# Patient Record
Sex: FEMALE | Race: BLACK OR AFRICAN AMERICAN | NOT HISPANIC OR LATINO | URBAN - METROPOLITAN AREA
[De-identification: names, ages, dates, MRNs, and addresses within clinical notes are randomized per-mention and may not be internally consistent; named-entity substitution may affect disease eponyms.]

---

## 2020-05-11 ENCOUNTER — EMERGENCY (EMERGENCY)
Facility: HOSPITAL | Age: 25
LOS: 1 days | Discharge: HOME | End: 2020-05-11
Attending: EMERGENCY MEDICINE | Admitting: EMERGENCY MEDICINE
Payer: COMMERCIAL

## 2020-05-11 VITALS
SYSTOLIC BLOOD PRESSURE: 115 MMHG | OXYGEN SATURATION: 100 % | TEMPERATURE: 99 F | DIASTOLIC BLOOD PRESSURE: 56 MMHG | RESPIRATION RATE: 16 BRPM | HEART RATE: 66 BPM

## 2020-05-11 VITALS
OXYGEN SATURATION: 100 % | SYSTOLIC BLOOD PRESSURE: 133 MMHG | HEART RATE: 92 BPM | RESPIRATION RATE: 16 BRPM | DIASTOLIC BLOOD PRESSURE: 78 MMHG | TEMPERATURE: 99 F

## 2020-05-11 LAB
ALBUMIN SERPL ELPH-MCNC: 4.2 G/DL — SIGNIFICANT CHANGE UP (ref 3.3–5)
ALP SERPL-CCNC: 47 U/L — SIGNIFICANT CHANGE UP (ref 40–120)
ALT FLD-CCNC: 8 U/L — SIGNIFICANT CHANGE UP (ref 4–33)
ANION GAP SERPL CALC-SCNC: 15 MMO/L — HIGH (ref 7–14)
APPEARANCE UR: CLEAR — SIGNIFICANT CHANGE UP
AST SERPL-CCNC: 13 U/L — SIGNIFICANT CHANGE UP (ref 4–32)
BACTERIA # UR AUTO: SIGNIFICANT CHANGE UP
BASOPHILS # BLD AUTO: 0.05 K/UL — SIGNIFICANT CHANGE UP (ref 0–0.2)
BASOPHILS NFR BLD AUTO: 0.6 % — SIGNIFICANT CHANGE UP (ref 0–2)
BILIRUB SERPL-MCNC: 0.2 MG/DL — SIGNIFICANT CHANGE UP (ref 0.2–1.2)
BILIRUB UR-MCNC: NEGATIVE — SIGNIFICANT CHANGE UP
BLD GP AB SCN SERPL QL: NEGATIVE — SIGNIFICANT CHANGE UP
BLOOD UR QL VISUAL: HIGH
BUN SERPL-MCNC: 8 MG/DL — SIGNIFICANT CHANGE UP (ref 7–23)
CALCIUM SERPL-MCNC: 9.3 MG/DL — SIGNIFICANT CHANGE UP (ref 8.4–10.5)
CHLORIDE SERPL-SCNC: 102 MMOL/L — SIGNIFICANT CHANGE UP (ref 98–107)
CO2 SERPL-SCNC: 23 MMOL/L — SIGNIFICANT CHANGE UP (ref 22–31)
COLOR SPEC: YELLOW — SIGNIFICANT CHANGE UP
CREAT SERPL-MCNC: 0.82 MG/DL — SIGNIFICANT CHANGE UP (ref 0.5–1.3)
EOSINOPHIL # BLD AUTO: 0.18 K/UL — SIGNIFICANT CHANGE UP (ref 0–0.5)
EOSINOPHIL NFR BLD AUTO: 2.3 % — SIGNIFICANT CHANGE UP (ref 0–6)
GLUCOSE SERPL-MCNC: 82 MG/DL — SIGNIFICANT CHANGE UP (ref 70–99)
GLUCOSE UR-MCNC: NEGATIVE — SIGNIFICANT CHANGE UP
HCG SERPL-ACNC: 4185 MIU/ML — SIGNIFICANT CHANGE UP
HCT VFR BLD CALC: 32.7 % — LOW (ref 34.5–45)
HGB BLD-MCNC: 10.9 G/DL — LOW (ref 11.5–15.5)
HYALINE CASTS # UR AUTO: NEGATIVE — SIGNIFICANT CHANGE UP
IMM GRANULOCYTES NFR BLD AUTO: 0.3 % — SIGNIFICANT CHANGE UP (ref 0–1.5)
KETONES UR-MCNC: NEGATIVE — SIGNIFICANT CHANGE UP
LEUKOCYTE ESTERASE UR-ACNC: NEGATIVE — SIGNIFICANT CHANGE UP
LYMPHOCYTES # BLD AUTO: 2.12 K/UL — SIGNIFICANT CHANGE UP (ref 1–3.3)
LYMPHOCYTES # BLD AUTO: 27 % — SIGNIFICANT CHANGE UP (ref 13–44)
MCHC RBC-ENTMCNC: 27.9 PG — SIGNIFICANT CHANGE UP (ref 27–34)
MCHC RBC-ENTMCNC: 33.3 % — SIGNIFICANT CHANGE UP (ref 32–36)
MCV RBC AUTO: 83.6 FL — SIGNIFICANT CHANGE UP (ref 80–100)
MONOCYTES # BLD AUTO: 0.52 K/UL — SIGNIFICANT CHANGE UP (ref 0–0.9)
MONOCYTES NFR BLD AUTO: 6.6 % — SIGNIFICANT CHANGE UP (ref 2–14)
NEUTROPHILS # BLD AUTO: 4.95 K/UL — SIGNIFICANT CHANGE UP (ref 1.8–7.4)
NEUTROPHILS NFR BLD AUTO: 63.2 % — SIGNIFICANT CHANGE UP (ref 43–77)
NITRITE UR-MCNC: NEGATIVE — SIGNIFICANT CHANGE UP
NRBC # FLD: 0 K/UL — SIGNIFICANT CHANGE UP (ref 0–0)
PH UR: 7 — SIGNIFICANT CHANGE UP (ref 5–8)
PLATELET # BLD AUTO: 285 K/UL — SIGNIFICANT CHANGE UP (ref 150–400)
PMV BLD: 10.4 FL — SIGNIFICANT CHANGE UP (ref 7–13)
POTASSIUM SERPL-MCNC: 3.7 MMOL/L — SIGNIFICANT CHANGE UP (ref 3.5–5.3)
POTASSIUM SERPL-SCNC: 3.7 MMOL/L — SIGNIFICANT CHANGE UP (ref 3.5–5.3)
PROT SERPL-MCNC: 7.4 G/DL — SIGNIFICANT CHANGE UP (ref 6–8.3)
PROT UR-MCNC: 20 — SIGNIFICANT CHANGE UP
RBC # BLD: 3.91 M/UL — SIGNIFICANT CHANGE UP (ref 3.8–5.2)
RBC # FLD: 14.1 % — SIGNIFICANT CHANGE UP (ref 10.3–14.5)
RBC CASTS # UR COMP ASSIST: HIGH (ref 0–?)
RH IG SCN BLD-IMP: POSITIVE — SIGNIFICANT CHANGE UP
SODIUM SERPL-SCNC: 140 MMOL/L — SIGNIFICANT CHANGE UP (ref 135–145)
SP GR SPEC: 1.03 — SIGNIFICANT CHANGE UP (ref 1–1.04)
SQUAMOUS # UR AUTO: SIGNIFICANT CHANGE UP
UROBILINOGEN FLD QL: SIGNIFICANT CHANGE UP
WBC # BLD: 7.84 K/UL — SIGNIFICANT CHANGE UP (ref 3.8–10.5)
WBC # FLD AUTO: 7.84 K/UL — SIGNIFICANT CHANGE UP (ref 3.8–10.5)
WBC UR QL: SIGNIFICANT CHANGE UP (ref 0–?)

## 2020-05-11 PROCEDURE — 76830 TRANSVAGINAL US NON-OB: CPT | Mod: 26

## 2020-05-11 PROCEDURE — 99284 EMERGENCY DEPT VISIT MOD MDM: CPT

## 2020-05-11 NOTE — ED PROVIDER NOTE - CLINICAL SUMMARY MEDICAL DECISION MAKING FREE TEXT BOX
Patino, PGY1 - 24F  ~9 weeks pregnant, +hcg, unconfirmed IUP, LMP 3/8/29 p/w vaginal bleeding with clots x 3 days. Hemodynamically stable. Pelvic exam with dark red blood with clots, no pooling. Ddx includes ectopic pregnancy, miscarriage, normal pregnancy. Plan: labs, ua, TVUS. Patino, PGY1 - 24F  ~9 weeks pregnant, +hcg, unconfirmed IUP, LMP 3/8/20 p/w vaginal bleeding with clots x 3 days. Hemodynamically stable. Pelvic exam with dark red blood with clots, no pooling. Abdomen nontender. DDx includes ectopic pregnancy, miscarriage, normal pregnancy. Plan: labs, ua, TVUS.

## 2020-05-11 NOTE — ED ADULT TRIAGE NOTE - CHIEF COMPLAINT QUOTE
Pt reporting to the ED for abdominal cramping. Pt reports she is pregnant, LMP , pt is about 9 weeks pregnant, + hcg test at [facility] on Saturday, no US. PT is . PT reports lower abdominal cramping starting yesterday, reports pain is 3/10 right now. Pt reports vaginal bleeding starting yesterday saturating 3 pads per day. Pt reports using bathroom this evening and feeling "like something fell out of me" pr reports she thinks it was a clot. Pt denies chest pain or SOB. Denies light headed or dizziness.

## 2020-05-11 NOTE — ED PROVIDER NOTE - ATTENDING CONTRIBUTION TO CARE
Dr. Champagne:  I have personally performed a face to face bedside history and physical examination of this patient. I have discussed the history, examination, review of systems, assessment and plan of management with the resident. I have reviewed the electronic medical record and amended it to reflect my history, review of systems, physical exam, assessment and plan.    24F  LMP 3/12/2020 presents with vaginal bleeding since yesterday.  Used 3 pads yesterday, today noted some clots.  +Mild abdominal cramping.  Denies fever/chills, cp, sob, n/v/d, urinary symptoms.      Exam:  - nad  - rrr   -ctab   -abd soft ntnd  - pelvic exam with small amount of blood, non-tender on exam    A/P  - threatened miscarriage, r/o ectopic  - cbc, cmp, type and screen, hcg, TVUS, ua

## 2020-05-11 NOTE — ED PROVIDER NOTE - PHYSICAL EXAMINATION
I have reviewed the triage vital signs.  Const: AAOx3, in NAD  Eyes: no conjunctival injection  HENT: NCAT, Neck supple, oral mucosa moist  CV: RRR, +S1, S2  Resp: CTAB, no respiratory distress  GI: Abdomen soft, NTND, no guarding, no CVA tenderness  : Pelvic exam chaperoned by Dr. Hawa Champagne. External genitalia unremarkable. Speculum exam with dark red blood and clots, no pooling. Cervix feels open to palpation.  Extremities: No peripheral edema,  2+ radial and DP pulses  Skin: Warm, well perfused, no rash  MSK: No gross deformities appreciated  Neuro: No focal sensory or motor deficits  Psych: Appropriate mood and affect

## 2020-05-11 NOTE — ED PROVIDER NOTE - NS ED ROS FT
General: Denies fevers or chills  Eyes: Denies vision changes  ENMT: Denies trouble swallowing or speaking, or sore throat  CV: +Chest pain. Dnies palpitations  Resp: Denies cough or SOB  GI: +Nausea. Denies abdominal pain,  vomiting, diarrhea, or constipation   : +Pelvic cramps, vaginal bleeding. Denies painful urination, increased urinary frequency, or blood in urine  Skin: Denies new rashes  Neuro: Denies headache, numbness, or weakness  MSK: Denies recent trauma

## 2020-05-11 NOTE — ED PROVIDER NOTE - PATIENT PORTAL LINK FT
You can access the FollowMyHealth Patient Portal offered by [facility] by registering at the following website: [email or web address]. By joining [employer]’s FollowMyHealth portal, you will also be able to view your health information using other applications (apps) compatible with our system.

## 2020-05-11 NOTE — ED PROVIDER NOTE - OBJECTIVE STATEMENT
24F no PMH  ~9weeks pregnant by LMP 3/8/20 unconfirmed IUP p/w vaginal bleeding x 3 days. Pt states it started with spotting and has progressed to heavier bleeding with clots. Pt went through 3 soaked pads yesterday. A/w pelvic cramps. Pt had a blood test drawn on  with +hcg. No f/c, vomiting, abdominal pain, flank pain, urinary sx. Pt reports nausea lately and some dizziness yesterday. Patient also reports 1 day of CP 4 days ago. Nonexertional and no SOB.

## 2020-05-12 NOTE — CONSULT NOTE ADULT - ASSESSMENT
25y/o , LMP 3/8/20, presents c/o pelvic cramping and vaginal bleeding x2 days. Patient endorses passing tissue this evening. VSS. B-hcg >4,000 without evidence of an IUP or adnexal massess on US. Benign physical exam. Clinical presentation consistent with likely complete SAB. Given that no prior US is available that confirms an IUP was present before this visit today, we will treat this as a pregnancy of unknown location and recommend close follow-up.     -Patient to follow-up in 48 hours for a repeat b-hcg with her private GYN or at our clinic; patient was give the phone number for the clinic.  -Precautions given re: heavy vaginal bleeding, fever, severe abdominal pain.  -Rh +, no rhogam required.  -All questions answered to the apparent satisfaction of the patient.     Patient d/w Dr. Dagoberto Jarrett PGY-2

## 2020-05-12 NOTE — CONSULT NOTE ADULT - SUBJECTIVE AND OBJECTIVE BOX
R2 GYN ED CONSULT NOTE    HPI:  23yo G_P_     Pt denies fever, chills, nausea, vomiting, diarrhea, headache, constipation, dizzyness, syncope, chest pain, palpitations, shortness of breath, dysuria, urgency, frequency, abdominal/pelvic pain, vaginal bleeding/discharge/odor/pain/itching, breast lumps/bumps, dyspareunia.    In ED today    Primary OB/GYN:    OBH:  GYNH: denies hx of fibroids, ov cysts, abnl paps, sti  PMSH:  MEDS: none  ALL: nkda  SOCH: denies t/e/d; safe at home  FAMH: denies fam hx of breast/uterine/ovarian/colon cancer    ROS: negative except per hpi    OBJECTIVE:    VITAL SIGNS:  Vital Signs Last 24 Hrs  T(C): 37.1 (11 May 2020 23:37), Max: 37.1 (11 May 2020 23:37)  T(F): 98.7 (11 May 2020 23:37), Max: 98.7 (11 May 2020 23:37)  HR: 66 (11 May 2020 23:37) (66 - 92)  BP: 115/56 (11 May 2020 23:37) (115/56 - 135/62)  BP(mean): --  RR: 16 (11 May 2020 23:37) (16 - 16)  SpO2: 100% (11 May 2020 23:37) (100% - 100%)    CAPILLARY BLOOD GLUCOSE            PHYSICAL EXAM:  GEN: NAD, A&Ox3  CV: RRR  LUNGS: CTAB  ABD: soft, NT, ND, +BS  SPECULUM:  PELVIC:  EXT: No LE edema/tenderness    LABS:                        10.9   7.84  )-----------( 285      ( 11 May 2020 21:31 )             32.7   baso 0.6    eos 2.3    imm gran 0.3    lymph 27.0   mono 6.6    poly 63.2       0511    140  |  102  |  8   ----------------------------<  82  3.7   |  23  |  0.82    Ca    9.3      11 May 2020 21:31    TPro  7.4  /  Alb  4.2  /  TBili  0.2  /  DBili  x   /  AST  13  /  ALT  8   /  AlkPhos  47  05-11      Urinalysis Basic - ( 11 May 2020 22:50 )    Color: YELLOW / Appearance: CLEAR / S.028 / pH: 7.0  Gluc: NEGATIVE / Ketone: NEGATIVE  / Bili: NEGATIVE / Urobili: TRACE   Blood: MODERATE / Protein: 20 / Nitrite: NEGATIVE   Leuk Esterase: NEGATIVE / RBC: 26-50 / WBC 3-5   Sq Epi: OCC / Non Sq Epi: x / Bacteria: FEW        RADIOLOGY: R2 GYN ED CONSULT NOTE    HPI:  25y/o , LMP 3/8/20, presents c/o pelvic cramping and vaginal bleeding x2 days. Patient reports she started to have pelvic cramping  associated with vaginal bleeding. She used 4 pads that day, which were not soaked. On Monday, the pelvic cramping worsened and then she passed tissue while sitting on the toilet. Her bleeding improved thereafter, and she used 3 pads during the day, not soaked. Currently endorses her bleeding has improved, and denies cramping at this time. She saw her PCP 1 week ago due to missing her period, and at that time was told the serum b-hcg was positive, but she was not given a value and did not have a sono done.  Denies associated fever, chills, nausea, vomiting, dizziness, syncope, chest pain, palpitations, shortness of breath, dysuria, abdominal/pelvic pain. Bleeding is light.      In the ED today her vitals were stable on arrival. Patient demonstrated to writer a picture of likely POC she passed into the toilet.    Primary OB/GYN: Dr. Roberson ([facility])    OBH: current  GYNH: denies hx of fibroids, ov cysts, abnl paps, sti  PMSH: denies  MEDS: none  ALL: nkda  SOCH: denies t/e/d; safe at home  FAMH: denies fam hx of breast/uterine/ovarian/colon cancer    ROS: negative except per hpi    OBJECTIVE:    VITAL SIGNS:  Vital Signs Last 24 Hrs  T(C): 37.1 (11 May 2020 23:37), Max: 37.1 (11 May 2020 23:37)  T(F): 98.7 (11 May 2020 23:37), Max: 98.7 (11 May 2020 23:37)  HR: 66 (11 May 2020 23:37) (66 - 92)  BP: 115/56 (11 May 2020 23:37) (115/56 - 135/62)  BP(mean): --  RR: 16 (11 May 2020 23:37) (16 - 16)  SpO2: 100% (11 May 2020 23:37) (100% - 100%)      PHYSICAL EXAM:  GEN: NAD, A&Ox3  CV: RRR  LUNGS: CTAB  ABD: soft, NT, ND  SPECULUM: small amount of blood in vault evacuated; cervix appears closed  PELVIC: closed cervix, no CMT, no fundal tenderness, no adnexal masses or tenderness  EXT: No LE edema/tenderness    LABS:    B-hc                        10.9   7.84  )-----------( 285      ( 11 May 2020 21:31 )             32.7   baso 0.6    eos 2.3    imm gran 0.3    lymph 27.0   mono 6.6    poly 63.2           140  |  102  |  8   ----------------------------<  82  3.7   |  23  |  0.82    Ca    9.3      11 May 2020 21:31    TPro  7.4  /  Alb  4.2  /  TBili  0.2  /  DBili  x   /  AST  13  /  ALT  8   /  AlkPhos  47  -      Urinalysis Basic - ( 11 May 2020 22:50 )    Color: YELLOW / Appearance: CLEAR / S.028 / pH: 7.0  Gluc: NEGATIVE / Ketone: NEGATIVE  / Bili: NEGATIVE / Urobili: TRACE   Blood: MODERATE / Protein: 20 / Nitrite: NEGATIVE   Leuk Esterase: NEGATIVE / RBC: 26-50 / WBC 3-5   Sq Epi: OCC / Non Sq Epi: x / Bacteria: FEW      RADIOLOGY:    EXAM:  US TRANSVAGINAL    PROCEDURE DATE:  May 11 2020   INTERPRETATION:  CLINICAL INFORMATION: Vaginal bleeding, positive beta hCG, evaluate for ectopic pregnancy.    LMP: 3/8/2020  Estimated Gestational Age by LMP: 9 weeks 1 day.    COMPARISON: None available.    TECHNIQUE: Transabdominal and Endovaginal pelvic sonogram.     FINDINGS:    Uterus: 8.1 x 3.9 x 6.1 cm. No intrauterine or extrauterine pregnancy is identified on the sonogram.    Endometrium: 9 mm with heterogeneous appearance consistent with internal bleeding.    Right ovary: 2.5 x 2 x 4.2 x 1 cm. A 1.3 x 1.5 x 1.4 cm corpus luteal cyst. Normal Doppler flow.    Left ovary: 2.8 x 1.3 x 1.8 cm. Within normal limits. Normal Doppler flow.    No free fluid.    IMPRESSION:  No intrauterine or extrauterine pregnancy identified on the sonogram. Follow-up beta-hCG and consider follow-up ultrasound as warranted for pregnancy of unknown location.

## 2020-05-12 NOTE — CONSULT NOTE ADULT - ATTENDING COMMENTS
Attending Note    Pt presents to ER with bleeding and cramping   As per pt she passed tissue   TVUS no IUP or ectopic   most likely complete ab  precautions reviewed  f/u in hcg in 48 hours

## 2020-05-13 PROBLEM — Z00.00 ENCOUNTER FOR PREVENTIVE HEALTH EXAMINATION: Status: ACTIVE | Noted: 2020-05-13

## 2021-08-12 ENCOUNTER — RESULT REVIEW (OUTPATIENT)
Age: 26
End: 2021-08-12

## 2022-08-22 ENCOUNTER — NON-APPOINTMENT (OUTPATIENT)
Age: 27
End: 2022-08-22

## 2022-10-23 ENCOUNTER — EMERGENCY (EMERGENCY)
Facility: HOSPITAL | Age: 27
LOS: 1 days | Discharge: LEFT BEFORE DISCHARGE | End: 2022-10-23
Attending: EMERGENCY MEDICINE | Admitting: EMERGENCY MEDICINE

## 2022-10-23 VITALS
DIASTOLIC BLOOD PRESSURE: 76 MMHG | TEMPERATURE: 98 F | RESPIRATION RATE: 16 BRPM | OXYGEN SATURATION: 100 % | HEART RATE: 71 BPM | SYSTOLIC BLOOD PRESSURE: 100 MMHG

## 2022-10-23 VITALS
TEMPERATURE: 98 F | SYSTOLIC BLOOD PRESSURE: 124 MMHG | HEART RATE: 66 BPM | DIASTOLIC BLOOD PRESSURE: 82 MMHG | OXYGEN SATURATION: 100 % | RESPIRATION RATE: 17 BRPM

## 2022-10-23 PROBLEM — Z78.9 OTHER SPECIFIED HEALTH STATUS: Chronic | Status: ACTIVE | Noted: 2020-05-11

## 2022-10-23 LAB
ALBUMIN SERPL ELPH-MCNC: 4.5 G/DL — SIGNIFICANT CHANGE UP (ref 3.3–5)
ALP SERPL-CCNC: 53 U/L — SIGNIFICANT CHANGE UP (ref 40–120)
ALT FLD-CCNC: 24 U/L — SIGNIFICANT CHANGE UP (ref 4–33)
ANION GAP SERPL CALC-SCNC: 12 MMOL/L — SIGNIFICANT CHANGE UP (ref 7–14)
AST SERPL-CCNC: 16 U/L — SIGNIFICANT CHANGE UP (ref 4–32)
BASOPHILS # BLD AUTO: 0.03 K/UL — SIGNIFICANT CHANGE UP (ref 0–0.2)
BASOPHILS NFR BLD AUTO: 0.4 % — SIGNIFICANT CHANGE UP (ref 0–2)
BILIRUB SERPL-MCNC: 0.5 MG/DL — SIGNIFICANT CHANGE UP (ref 0.2–1.2)
BLD GP AB SCN SERPL QL: NEGATIVE — SIGNIFICANT CHANGE UP
BUN SERPL-MCNC: 9 MG/DL — SIGNIFICANT CHANGE UP (ref 7–23)
CALCIUM SERPL-MCNC: 9.7 MG/DL — SIGNIFICANT CHANGE UP (ref 8.4–10.5)
CHLORIDE SERPL-SCNC: 102 MMOL/L — SIGNIFICANT CHANGE UP (ref 98–107)
CO2 SERPL-SCNC: 25 MMOL/L — SIGNIFICANT CHANGE UP (ref 22–31)
CREAT SERPL-MCNC: 0.74 MG/DL — SIGNIFICANT CHANGE UP (ref 0.5–1.3)
EGFR: 116 ML/MIN/1.73M2 — SIGNIFICANT CHANGE UP
EOSINOPHIL # BLD AUTO: 0.05 K/UL — SIGNIFICANT CHANGE UP (ref 0–0.5)
EOSINOPHIL NFR BLD AUTO: 0.6 % — SIGNIFICANT CHANGE UP (ref 0–6)
GLUCOSE SERPL-MCNC: 83 MG/DL — SIGNIFICANT CHANGE UP (ref 70–99)
HCG SERPL-ACNC: 6099 MIU/ML — SIGNIFICANT CHANGE UP
HCT VFR BLD CALC: 36.7 % — SIGNIFICANT CHANGE UP (ref 34.5–45)
HGB BLD-MCNC: 12 G/DL — SIGNIFICANT CHANGE UP (ref 11.5–15.5)
IANC: 6.15 K/UL — SIGNIFICANT CHANGE UP (ref 1.8–7.4)
IMM GRANULOCYTES NFR BLD AUTO: 0.2 % — SIGNIFICANT CHANGE UP (ref 0–0.9)
LYMPHOCYTES # BLD AUTO: 1.55 K/UL — SIGNIFICANT CHANGE UP (ref 1–3.3)
LYMPHOCYTES # BLD AUTO: 18.7 % — SIGNIFICANT CHANGE UP (ref 13–44)
MCHC RBC-ENTMCNC: 27.8 PG — SIGNIFICANT CHANGE UP (ref 27–34)
MCHC RBC-ENTMCNC: 32.7 GM/DL — SIGNIFICANT CHANGE UP (ref 32–36)
MCV RBC AUTO: 85 FL — SIGNIFICANT CHANGE UP (ref 80–100)
MONOCYTES # BLD AUTO: 0.49 K/UL — SIGNIFICANT CHANGE UP (ref 0–0.9)
MONOCYTES NFR BLD AUTO: 5.9 % — SIGNIFICANT CHANGE UP (ref 2–14)
NEUTROPHILS # BLD AUTO: 6.15 K/UL — SIGNIFICANT CHANGE UP (ref 1.8–7.4)
NEUTROPHILS NFR BLD AUTO: 74.2 % — SIGNIFICANT CHANGE UP (ref 43–77)
NRBC # BLD: 0 /100 WBCS — SIGNIFICANT CHANGE UP (ref 0–0)
NRBC # FLD: 0 K/UL — SIGNIFICANT CHANGE UP (ref 0–0)
PLATELET # BLD AUTO: 274 K/UL — SIGNIFICANT CHANGE UP (ref 150–400)
POTASSIUM SERPL-MCNC: 3.8 MMOL/L — SIGNIFICANT CHANGE UP (ref 3.5–5.3)
POTASSIUM SERPL-SCNC: 3.8 MMOL/L — SIGNIFICANT CHANGE UP (ref 3.5–5.3)
PROT SERPL-MCNC: 7.8 G/DL — SIGNIFICANT CHANGE UP (ref 6–8.3)
RBC # BLD: 4.32 M/UL — SIGNIFICANT CHANGE UP (ref 3.8–5.2)
RBC # FLD: 13.3 % — SIGNIFICANT CHANGE UP (ref 10.3–14.5)
RH IG SCN BLD-IMP: POSITIVE — SIGNIFICANT CHANGE UP
SODIUM SERPL-SCNC: 139 MMOL/L — SIGNIFICANT CHANGE UP (ref 135–145)
WBC # BLD: 8.29 K/UL — SIGNIFICANT CHANGE UP (ref 3.8–10.5)
WBC # FLD AUTO: 8.29 K/UL — SIGNIFICANT CHANGE UP (ref 3.8–10.5)

## 2022-10-23 PROCEDURE — 76817 TRANSVAGINAL US OBSTETRIC: CPT | Mod: 26

## 2022-10-23 PROCEDURE — 99284 EMERGENCY DEPT VISIT MOD MDM: CPT

## 2022-10-23 NOTE — ED PROVIDER NOTE - OBJECTIVE STATEMENT
25 y/o F,  (1 miscarriage, 2 abortions) w/ no pertinent PMHx or PSHx presents to ED c/o intermittent vaginal bleeding since 10/13. Pt reports she was seen by her GYN and ultrasound was performed, indicating no fetal heart rate. States repeat ultrasound performed few days later w/ no fetal heart rate. Ultrasound performed yesterday, pt states, "looks as though she is having a miscarriage." Associated significant bleeding and abdominal cramping. Denies allergies or medication use.     denies N/V/ HA/ fever/ chest pain/ SOB/ dysuria/ urgency/ vaginal dc/ extremity issue 27 y/o F,  (1 miscarriage, 2 abortions) w/ no pertinent PMHx or PSHx presents to ED c/o intermittent vaginal bleeding since 10/13. Pt reports she was seen by her GYN and ultrasound was performed, indicating no fetal heart rate. States repeat ultrasound performed few days later w/ no fetal heart rate. Ultrasound performed yesterday, pt states, "looks as though she is having a miscarriage." Associated significant bleeding and abdominal cramping. Denies allergies or medication use.     denies N/V/ HA/ fever/ chest pain/ SOB/ dysuria/ urgency/  extremity issue

## 2022-10-23 NOTE — ED ADULT NURSE NOTE - OBJECTIVE STATEMENT
Pt is A&O x 4, ambulatory w/o assistance, shows no signs of acute distress. Brought into the hospital for vaginal bleeding. Pt, , is currently 6 weeks pregnant. Pt endorses abdominal cramping and vaginal bleeding, states she saturated 2 sanitary napkins since last night. Pt states she has had a miscarriage before, with similar symptoms. Pt passed a large blood clot yesterday. Shortly afterward, she experienced an episode of mild SOB and chest discomfort, which has since resolved. Respirations currently even and unlabored. SpO2 100% on room air. Currently denies chest discomfort. Endorses mild abdominal cramping, n/v/d. 20 gauge right AC placed. Labs drawn. Will continue to monitor.

## 2022-10-23 NOTE — ED PROVIDER NOTE - PHYSICAL EXAMINATION
pt alert and can phonate well  h at/nc  perrl, conj clear, sclera anicteric,  neck supple  throat no exudate  cor rrr pos s1s2  lungs clear to asno wheeze  abd soft no r/g/t  ext no edema no deformities  neuro awake, lucid normal gait moves all extremities with strength  psych normal affect  vs reasonable pt alert and can phonate well  h at/nc  perrl, conj clear, sclera anicteric,  neck supple  abd soft no r/g/t  gyn exam: no external lesions, on speculum, os open, passing material, material collected and put in path cup.  open 1-2cm, non tender  ext no edema no deformities  neuro awake, lucid normal gait moves all extremities with strength  psych normal affect  vs reasonable

## 2022-10-23 NOTE — CONSULT NOTE ADULT - SUBJECTIVE AND OBJECTIVE BOX
AMARIS CHAPA  26y  Female [id number]    HPI:  26 year old  LMP /16 (at 9+5 days by LMP) presenting with vaginal bleeding and cramping. Pt states she started having vaginal spotting 10 days ago, however, yesterday it became heavier like a menstrual period. She used 2 pads in the day. Pt endorsing cramping. Denies dizziness, nausea, vomiting, fevers, chills, palpitations, SOB. Pt had a TVUS with her private GYN out of [place] last month and they saw an intrauterine gestational sac with no FH.       Name of GYN Physician: Dr. Lott - [place]     Past OB:    SAB x1  TOP x2     Past gyn: Denies fibroids, cysts, endometriosis, STI's, Abnormal pap smears     Home meds: none     Allergies: NKDA     PAST MEDICAL & SURGICAL HISTORY:  - No pertinent past medical history  - No significant past surgical history    Social History:  Denies smoking use, drug use, alcohol use.     Vital Signs Last 24 Hrs  T(C): 36.8 (23 Oct 2022 18:22), Max: 36.9 (23 Oct 2022 13:37)  T(F): 98.3 (23 Oct 2022 18:22), Max: 98.4 (23 Oct 2022 13:37)  HR: 66 (23 Oct 2022 18:22) (66 - 71)  BP: 124/82 (23 Oct 2022 18:22) (100/76 - 124/82)  BP(mean): --  RR: 17 (23 Oct 2022 18:22) (16 - 17)  SpO2: 100% (23 Oct 2022 18:22) (100% - 100%)    Parameters below as of 23 Oct 2022 18:22  Patient On (Oxygen Delivery Method): room air        Physical Exam:   General: sitting comfortably in bed, NAD   Back: No CVA tenderness  Abd: Soft, non-tender, non-distended.  :  No bleeding on pad.    External labia wnl.  Bimanual exam with no cervical motion tenderness, uterus wnl, adnexa non palpable b/l.  Cervix closed   Speculum Exam: No active bleeding from os. Scant blood in posterior vault.    Ext: non-tender b/l, no edema     LABS:                              12.0   8.29  )-----------( 274      ( 23 Oct 2022 14:30 )             36.7     10-23    139  |  102  |  9   ----------------------------<  83  3.8   |  25  |  0.74    Ca    9.7      23 Oct 2022 14:30    TPro  7.8  /  Alb  4.5  /  TBili  0.5  /  DBili  x   /  AST  16  /  ALT  24  /  AlkPhos  53  10-23    I&O's Detail      HCG Quantitative, Serum: 6099.0    RADIOLOGY & ADDITIONAL STUDIES:    < from: US Transvaginal, OB (10.23.22 @ 16:28) >  US OB TRANSVAGINAL                          PROCEDURE DATE:  10/23/2022          INTERPRETATION:  CLINICAL INFORMATION: Pregnant with vaginal bleeding.   Beta hCG 6099.    LMP: 2022.  HCG 6099 on 10/23/2022  Estimated Gestational Age by LMP: 9 weeks 5 days    COMPARISON: Pelvic ultrasound dated 2020    Endovaginal pelvic sonogram only. Color and Spectral Doppler was   performed.    FINDINGS:    Uterus: An elongated shaped gestational sac is present in the lower   uterine segment near the external cervical os. Small adjacent   subchorionic hematoma. Trace fluid of the upper cervix.    Mean Sac Diameter: 1.47cm, 6W1D, 2.5 x 0.8 x 1.2cm elongated shape  Crown Rump Length: 2.9 mm , 6W0D  Yolk Sac: Present  Fetal Heart Rate: Absent    Right ovary: Measures 2.4 cm x 1.7 cm x 1.2 cm. Vascular flow identified.  Left ovary: Measures 2.9 cm x 1.5 cm x 2.1 cm. Vascular flow identified,   though evaluation is limited due to shadowing artifact. Left corpus   luteum cyst.    Fluid: None.    IMPRESSION:    Intrauterine pregnancy with an elongated gestational sac at the lower   uterine segment abutting the external cervical os, low in location.   Absent fetal cardiac activity suspicious for pregnancy demise. Trace   fluid of the upper cervix. Suggest OBGYN follow up.    --- End of Report ---        < end of copied text >

## 2022-10-23 NOTE — ED PROVIDER NOTE - PATIENT PORTAL LINK FT
You can access the FollowMyHealth Patient Portal offered by [facility] by registering at the following website: [email or web address]. By joining [employer]’s FollowMyHealth portal, you will also be able to view your health information using other applications (apps) compatible with our system.

## 2022-10-23 NOTE — ED PROVIDER NOTE - PROGRESS NOTE DETAILS
Gary LOPEZ: Received sign out from my colleague Dr. Golden pt presents w vaginal bleeding, pending OB eval at time of sign out for likely miscarriage / retained producted, pt passed products her collected by Dr. Golden, specimen handed off to  OBGYN team, per OB pt is stable for dc, opted for expectant management. Strict return precautions were discussed. Pt expressed understanding. Gary LOPEZ: pt eloped prior to my reassessment prior to dispo. Gary LOPEZ: Received sign out from my colleague Dr. Golden pt presents w vaginal bleeding, pending OB eval at time of sign out for likely miscarriage / retained producted, pt passed products her collected by Dr. Golden, specimen handed off to  OBGYN team, per OB pt is stable for dc, opted for expectant management.

## 2022-10-23 NOTE — ED PROVIDER NOTE - NSFOLLOWUPINSTRUCTIONS_ED_ALL_ED_FT
Thank you for visiting our Emergency Department, it has been a pleasure taking part in your healthcare.    Your discharge diagnosis is: miscarriage  Please take all discharge medications as indicated below:  Please continue all medications as rx'd by your PMD.  Please follow up with your PMD within x48 hours.  Please follow up with OBGYN within x48 hours.  A copy of resulted labs, imaging, and findings have been provided to you.   You have had a detailed discussion with your provider regarding your diagnosis, care management and discharge planning including, but not limited to: return precautions, follow up visits with existing or new providers, new prescriptions and/or medication changes, wound and/or splint/cast care or other care   aspects specific to your diagnosis and treatment. You have been given the opportunity to have your questions answered. At this time you have been deemed stable and fit for discharge.  Return precautions to the Emergency Department include but are not limited to: unrelenting nausea, vomiting, fever, chills, chest pain, shortness of breath, dizziness, chest or abdominal pain, worsening back pain, syncope, blood in urine or stool, headache that doesn't resolve, numbness or tingling, loss of sensation, loss of motor function, or any other concerning symptoms.

## 2022-10-23 NOTE — CONSULT NOTE ADULT - ASSESSMENT
26 year old  LMP  (at 9+5 days by LMP) presenting with vaginal bleeding and cramping. Previously noted intrauterine gestational sac and fetal pole with no FH at her private OB. TVUS showing gestational sac in lower uterine segment. Products of conception removed by ED during their pelvic exam. Minimal bleeding on exam, VSS, H+H wnl.     - No acute GYN intervention  - Options of expectant management vs medical management vs surgical management reviewed with the patient. Pt opting for expectant management at this time.   - Recommend f/u in 1 week with private OBGYN Dr. Lott   - Return precautions reviewed: heavy vaginal bleeding  - Products to be sent to pathology     Milagros Carvajal, PGY2  d/w Dr. Queen  26 year old  LMP 8 (at 9+5 days by LMP) presenting with vaginal bleeding and cramping. Previously noted intrauterine gestational sac and fetal pole with no FH at her private OB. TVUS showing gestational sac in lower uterine segment. Products of conception removed by ED during their pelvic exam. Minimal bleeding on exam, VSS, H+H wnl.     - No acute GYN intervention  - Options of expectant management vs medical management vs surgical management reviewed with the patient. Pt opting for expectant management at this time.   - Recommend f/u in 1 week with private OBGYN Dr. Lott   - Return precautions reviewed: heavy vaginal bleeding  - Products to be sent to pathology     Milagros Carvajal, PGY2  d/w Dr. Queen     27 y/o  LMP  with threatened Ab and non-viable pregnancy, clinically stable. To f/u with Gyn as an outpatient.  Martin Queen M.D.

## 2022-10-23 NOTE — ED PROVIDER NOTE - CLINICAL SUMMARY MEDICAL DECISION MAKING FREE TEXT BOX
25 y/o F,  (1 miscarriage, 2 abortions) w/ no pertinent PMHx or PSHx presents to ED c/o intermittent vaginal bleeding since 10/13. Likely miscarriage. Will obtain transvaginal ultrasound to evaluate for any retained products. 25 y/o F,  (1 miscarriage, 2 abortions) w/ no pertinent PMHx or PSHx presents to ED c/o intermittent vaginal bleeding since 10/13. Likely miscarriage. Will obtain transvaginal ultrasound to evaluate for any retained products.    exam reveals products passing from cervix, not heavy bleeding, gyn called for patient

## 2022-10-25 ENCOUNTER — RESULT REVIEW (OUTPATIENT)
Age: 27
End: 2022-10-25

## 2022-10-25 PROCEDURE — 88305 TISSUE EXAM BY PATHOLOGIST: CPT | Mod: 26

## 2022-10-31 LAB — SURGICAL PATHOLOGY STUDY: SIGNIFICANT CHANGE UP

## 2023-01-19 ENCOUNTER — NON-APPOINTMENT (OUTPATIENT)
Age: 28
End: 2023-01-19

## 2023-11-08 ENCOUNTER — OUTPATIENT (OUTPATIENT)
Dept: OUTPATIENT SERVICES | Facility: HOSPITAL | Age: 28
LOS: 1 days | End: 2023-11-08
Payer: COMMERCIAL

## 2023-11-08 DIAGNOSIS — O26.899 OTHER SPECIFIED PREGNANCY RELATED CONDITIONS, UNSPECIFIED TRIMESTER: ICD-10-CM

## 2023-11-08 DIAGNOSIS — Z3A.00 WEEKS OF GESTATION OF PREGNANCY NOT SPECIFIED: ICD-10-CM

## 2023-11-08 PROCEDURE — 59025 FETAL NON-STRESS TEST: CPT

## 2023-11-08 PROCEDURE — G0463: CPT

## 2023-11-09 ENCOUNTER — NON-APPOINTMENT (OUTPATIENT)
Age: 28
End: 2023-11-09

## 2023-11-27 ENCOUNTER — INPATIENT (INPATIENT)
Facility: HOSPITAL | Age: 28
LOS: 2 days | Discharge: HOME | End: 2023-11-30
Attending: OBSTETRICS & GYNECOLOGY | Admitting: OBSTETRICS & GYNECOLOGY
Payer: COMMERCIAL

## 2023-11-27 VITALS
TEMPERATURE: 98 F | SYSTOLIC BLOOD PRESSURE: 140 MMHG | DIASTOLIC BLOOD PRESSURE: 88 MMHG | HEART RATE: 66 BPM | RESPIRATION RATE: 16 BRPM

## 2023-11-27 DIAGNOSIS — O26.899 OTHER SPECIFIED PREGNANCY RELATED CONDITIONS, UNSPECIFIED TRIMESTER: ICD-10-CM

## 2023-11-27 LAB
ALBUMIN SERPL ELPH-MCNC: 3.3 G/DL — SIGNIFICANT CHANGE UP (ref 3.3–5)
ALBUMIN SERPL ELPH-MCNC: 3.3 G/DL — SIGNIFICANT CHANGE UP (ref 3.3–5)
ALBUMIN SERPL ELPH-MCNC: 3.6 G/DL — SIGNIFICANT CHANGE UP (ref 3.3–5)
ALBUMIN SERPL ELPH-MCNC: 3.6 G/DL — SIGNIFICANT CHANGE UP (ref 3.3–5)
ALP SERPL-CCNC: 104 U/L — SIGNIFICANT CHANGE UP (ref 40–120)
ALP SERPL-CCNC: 104 U/L — SIGNIFICANT CHANGE UP (ref 40–120)
ALP SERPL-CCNC: 106 U/L — SIGNIFICANT CHANGE UP (ref 40–120)
ALP SERPL-CCNC: 106 U/L — SIGNIFICANT CHANGE UP (ref 40–120)
ALT FLD-CCNC: 16 U/L — SIGNIFICANT CHANGE UP (ref 4–33)
ALT FLD-CCNC: 16 U/L — SIGNIFICANT CHANGE UP (ref 4–33)
ALT FLD-CCNC: 17 U/L — SIGNIFICANT CHANGE UP (ref 4–33)
ALT FLD-CCNC: 17 U/L — SIGNIFICANT CHANGE UP (ref 4–33)
AMPHET UR-MCNC: NEGATIVE — SIGNIFICANT CHANGE UP
AMPHET UR-MCNC: NEGATIVE — SIGNIFICANT CHANGE UP
ANION GAP SERPL CALC-SCNC: 13 MMOL/L — SIGNIFICANT CHANGE UP (ref 7–14)
ANION GAP SERPL CALC-SCNC: 13 MMOL/L — SIGNIFICANT CHANGE UP (ref 7–14)
ANION GAP SERPL CALC-SCNC: 14 MMOL/L — SIGNIFICANT CHANGE UP (ref 7–14)
ANION GAP SERPL CALC-SCNC: 14 MMOL/L — SIGNIFICANT CHANGE UP (ref 7–14)
APPEARANCE UR: ABNORMAL
APPEARANCE UR: ABNORMAL
APTT BLD: 27.8 SEC — SIGNIFICANT CHANGE UP (ref 24.5–35.6)
APTT BLD: 27.8 SEC — SIGNIFICANT CHANGE UP (ref 24.5–35.6)
APTT BLD: 28.2 SEC — SIGNIFICANT CHANGE UP (ref 24.5–35.6)
APTT BLD: 28.2 SEC — SIGNIFICANT CHANGE UP (ref 24.5–35.6)
AST SERPL-CCNC: 17 U/L — SIGNIFICANT CHANGE UP (ref 4–32)
AST SERPL-CCNC: 17 U/L — SIGNIFICANT CHANGE UP (ref 4–32)
AST SERPL-CCNC: 28 U/L — SIGNIFICANT CHANGE UP (ref 4–32)
AST SERPL-CCNC: 28 U/L — SIGNIFICANT CHANGE UP (ref 4–32)
BACTERIA # UR AUTO: ABNORMAL /HPF
BACTERIA # UR AUTO: ABNORMAL /HPF
BARBITURATES UR SCN-MCNC: NEGATIVE — SIGNIFICANT CHANGE UP
BARBITURATES UR SCN-MCNC: NEGATIVE — SIGNIFICANT CHANGE UP
BASOPHILS # BLD AUTO: 0.02 K/UL — SIGNIFICANT CHANGE UP (ref 0–0.2)
BASOPHILS # BLD AUTO: 0.02 K/UL — SIGNIFICANT CHANGE UP (ref 0–0.2)
BASOPHILS # BLD AUTO: 0.03 K/UL — SIGNIFICANT CHANGE UP (ref 0–0.2)
BASOPHILS # BLD AUTO: 0.03 K/UL — SIGNIFICANT CHANGE UP (ref 0–0.2)
BASOPHILS NFR BLD AUTO: 0.2 % — SIGNIFICANT CHANGE UP (ref 0–2)
BASOPHILS NFR BLD AUTO: 0.2 % — SIGNIFICANT CHANGE UP (ref 0–2)
BASOPHILS NFR BLD AUTO: 0.4 % — SIGNIFICANT CHANGE UP (ref 0–2)
BASOPHILS NFR BLD AUTO: 0.4 % — SIGNIFICANT CHANGE UP (ref 0–2)
BENZODIAZ UR-MCNC: NEGATIVE — SIGNIFICANT CHANGE UP
BENZODIAZ UR-MCNC: NEGATIVE — SIGNIFICANT CHANGE UP
BILIRUB SERPL-MCNC: <0.2 MG/DL — SIGNIFICANT CHANGE UP (ref 0.2–1.2)
BILIRUB UR-MCNC: NEGATIVE — SIGNIFICANT CHANGE UP
BILIRUB UR-MCNC: NEGATIVE — SIGNIFICANT CHANGE UP
BLD GP AB SCN SERPL QL: NEGATIVE — SIGNIFICANT CHANGE UP
BLD GP AB SCN SERPL QL: NEGATIVE — SIGNIFICANT CHANGE UP
BUN SERPL-MCNC: 7 MG/DL — SIGNIFICANT CHANGE UP (ref 7–23)
BUN SERPL-MCNC: 7 MG/DL — SIGNIFICANT CHANGE UP (ref 7–23)
BUN SERPL-MCNC: 9 MG/DL — SIGNIFICANT CHANGE UP (ref 7–23)
BUN SERPL-MCNC: 9 MG/DL — SIGNIFICANT CHANGE UP (ref 7–23)
CALCIUM SERPL-MCNC: 8.4 MG/DL — SIGNIFICANT CHANGE UP (ref 8.4–10.5)
CALCIUM SERPL-MCNC: 8.4 MG/DL — SIGNIFICANT CHANGE UP (ref 8.4–10.5)
CALCIUM SERPL-MCNC: 9 MG/DL — SIGNIFICANT CHANGE UP (ref 8.4–10.5)
CALCIUM SERPL-MCNC: 9 MG/DL — SIGNIFICANT CHANGE UP (ref 8.4–10.5)
CAST: 2 /LPF — SIGNIFICANT CHANGE UP (ref 0–4)
CAST: 2 /LPF — SIGNIFICANT CHANGE UP (ref 0–4)
CHLORIDE SERPL-SCNC: 102 MMOL/L — SIGNIFICANT CHANGE UP (ref 98–107)
CHLORIDE SERPL-SCNC: 102 MMOL/L — SIGNIFICANT CHANGE UP (ref 98–107)
CHLORIDE SERPL-SCNC: 103 MMOL/L — SIGNIFICANT CHANGE UP (ref 98–107)
CHLORIDE SERPL-SCNC: 103 MMOL/L — SIGNIFICANT CHANGE UP (ref 98–107)
CO2 SERPL-SCNC: 18 MMOL/L — LOW (ref 22–31)
CO2 SERPL-SCNC: 18 MMOL/L — LOW (ref 22–31)
CO2 SERPL-SCNC: 20 MMOL/L — LOW (ref 22–31)
CO2 SERPL-SCNC: 20 MMOL/L — LOW (ref 22–31)
COCAINE METAB.OTHER UR-MCNC: NEGATIVE — SIGNIFICANT CHANGE UP
COCAINE METAB.OTHER UR-MCNC: NEGATIVE — SIGNIFICANT CHANGE UP
COLOR SPEC: YELLOW — SIGNIFICANT CHANGE UP
COLOR SPEC: YELLOW — SIGNIFICANT CHANGE UP
CREAT ?TM UR-MCNC: 178 MG/DL — SIGNIFICANT CHANGE UP
CREAT ?TM UR-MCNC: 178 MG/DL — SIGNIFICANT CHANGE UP
CREAT SERPL-MCNC: 0.46 MG/DL — LOW (ref 0.5–1.3)
CREAT SERPL-MCNC: 0.46 MG/DL — LOW (ref 0.5–1.3)
CREAT SERPL-MCNC: 0.58 MG/DL — SIGNIFICANT CHANGE UP (ref 0.5–1.3)
CREAT SERPL-MCNC: 0.58 MG/DL — SIGNIFICANT CHANGE UP (ref 0.5–1.3)
CREATININE URINE RESULT, DAU: 139 MG/DL — SIGNIFICANT CHANGE UP
CREATININE URINE RESULT, DAU: 139 MG/DL — SIGNIFICANT CHANGE UP
DIFF PNL FLD: ABNORMAL
DIFF PNL FLD: ABNORMAL
EGFR: 129 ML/MIN/1.73M2 — SIGNIFICANT CHANGE UP
EGFR: 129 ML/MIN/1.73M2 — SIGNIFICANT CHANGE UP
EGFR: 136 ML/MIN/1.73M2 — SIGNIFICANT CHANGE UP
EGFR: 136 ML/MIN/1.73M2 — SIGNIFICANT CHANGE UP
EOSINOPHIL # BLD AUTO: 0.02 K/UL — SIGNIFICANT CHANGE UP (ref 0–0.5)
EOSINOPHIL # BLD AUTO: 0.02 K/UL — SIGNIFICANT CHANGE UP (ref 0–0.5)
EOSINOPHIL # BLD AUTO: 0.1 K/UL — SIGNIFICANT CHANGE UP (ref 0–0.5)
EOSINOPHIL # BLD AUTO: 0.1 K/UL — SIGNIFICANT CHANGE UP (ref 0–0.5)
EOSINOPHIL NFR BLD AUTO: 0.2 % — SIGNIFICANT CHANGE UP (ref 0–6)
EOSINOPHIL NFR BLD AUTO: 0.2 % — SIGNIFICANT CHANGE UP (ref 0–6)
EOSINOPHIL NFR BLD AUTO: 1.2 % — SIGNIFICANT CHANGE UP (ref 0–6)
EOSINOPHIL NFR BLD AUTO: 1.2 % — SIGNIFICANT CHANGE UP (ref 0–6)
FIBRINOGEN PPP-MCNC: 462 MG/DL — SIGNIFICANT CHANGE UP (ref 200–465)
FIBRINOGEN PPP-MCNC: 462 MG/DL — SIGNIFICANT CHANGE UP (ref 200–465)
GLUCOSE SERPL-MCNC: 114 MG/DL — HIGH (ref 70–99)
GLUCOSE SERPL-MCNC: 114 MG/DL — HIGH (ref 70–99)
GLUCOSE SERPL-MCNC: 75 MG/DL — SIGNIFICANT CHANGE UP (ref 70–99)
GLUCOSE SERPL-MCNC: 75 MG/DL — SIGNIFICANT CHANGE UP (ref 70–99)
GLUCOSE UR QL: NEGATIVE MG/DL — SIGNIFICANT CHANGE UP
GLUCOSE UR QL: NEGATIVE MG/DL — SIGNIFICANT CHANGE UP
HBV SURFACE AG SERPL QL IA: SIGNIFICANT CHANGE UP
HBV SURFACE AG SERPL QL IA: SIGNIFICANT CHANGE UP
HCT VFR BLD CALC: 34.4 % — LOW (ref 34.5–45)
HCT VFR BLD CALC: 34.4 % — LOW (ref 34.5–45)
HCT VFR BLD CALC: 39.3 % — SIGNIFICANT CHANGE UP (ref 34.5–45)
HCT VFR BLD CALC: 39.3 % — SIGNIFICANT CHANGE UP (ref 34.5–45)
HGB BLD-MCNC: 12 G/DL — SIGNIFICANT CHANGE UP (ref 11.5–15.5)
HGB BLD-MCNC: 12 G/DL — SIGNIFICANT CHANGE UP (ref 11.5–15.5)
HGB BLD-MCNC: 13.4 G/DL — SIGNIFICANT CHANGE UP (ref 11.5–15.5)
HGB BLD-MCNC: 13.4 G/DL — SIGNIFICANT CHANGE UP (ref 11.5–15.5)
HIV 1+2 AB+HIV1 P24 AG SERPL QL IA: SIGNIFICANT CHANGE UP
HIV 1+2 AB+HIV1 P24 AG SERPL QL IA: SIGNIFICANT CHANGE UP
IANC: 6.12 K/UL — SIGNIFICANT CHANGE UP (ref 1.8–7.4)
IANC: 6.12 K/UL — SIGNIFICANT CHANGE UP (ref 1.8–7.4)
IANC: 8.05 K/UL — HIGH (ref 1.8–7.4)
IANC: 8.05 K/UL — HIGH (ref 1.8–7.4)
IMM GRANULOCYTES NFR BLD AUTO: 0.4 % — SIGNIFICANT CHANGE UP (ref 0–0.9)
IMM GRANULOCYTES NFR BLD AUTO: 0.4 % — SIGNIFICANT CHANGE UP (ref 0–0.9)
IMM GRANULOCYTES NFR BLD AUTO: 0.5 % — SIGNIFICANT CHANGE UP (ref 0–0.9)
IMM GRANULOCYTES NFR BLD AUTO: 0.5 % — SIGNIFICANT CHANGE UP (ref 0–0.9)
INR BLD: <0.9 RATIO — SIGNIFICANT CHANGE UP (ref 0.85–1.18)
INR BLD: <0.9 RATIO — SIGNIFICANT CHANGE UP (ref 0.85–1.18)
KETONES UR-MCNC: NEGATIVE MG/DL — SIGNIFICANT CHANGE UP
KETONES UR-MCNC: NEGATIVE MG/DL — SIGNIFICANT CHANGE UP
LDH SERPL L TO P-CCNC: 193 U/L — SIGNIFICANT CHANGE UP (ref 135–225)
LDH SERPL L TO P-CCNC: 193 U/L — SIGNIFICANT CHANGE UP (ref 135–225)
LDH SERPL L TO P-CCNC: 338 U/L — HIGH (ref 135–225)
LDH SERPL L TO P-CCNC: 338 U/L — HIGH (ref 135–225)
LEUKOCYTE ESTERASE UR-ACNC: ABNORMAL
LEUKOCYTE ESTERASE UR-ACNC: ABNORMAL
LYMPHOCYTES # BLD AUTO: 1.09 K/UL — SIGNIFICANT CHANGE UP (ref 1–3.3)
LYMPHOCYTES # BLD AUTO: 1.09 K/UL — SIGNIFICANT CHANGE UP (ref 1–3.3)
LYMPHOCYTES # BLD AUTO: 1.25 K/UL — SIGNIFICANT CHANGE UP (ref 1–3.3)
LYMPHOCYTES # BLD AUTO: 1.25 K/UL — SIGNIFICANT CHANGE UP (ref 1–3.3)
LYMPHOCYTES # BLD AUTO: 11.5 % — LOW (ref 13–44)
LYMPHOCYTES # BLD AUTO: 11.5 % — LOW (ref 13–44)
LYMPHOCYTES # BLD AUTO: 15.1 % — SIGNIFICANT CHANGE UP (ref 13–44)
LYMPHOCYTES # BLD AUTO: 15.1 % — SIGNIFICANT CHANGE UP (ref 13–44)
MAGNESIUM SERPL-MCNC: 4.4 MG/DL — HIGH (ref 1.6–2.6)
MAGNESIUM SERPL-MCNC: 5.6 MG/DL — HIGH (ref 1.6–2.6)
MAGNESIUM SERPL-MCNC: 5.6 MG/DL — HIGH (ref 1.6–2.6)
MCHC RBC-ENTMCNC: 29.4 PG — SIGNIFICANT CHANGE UP (ref 27–34)
MCHC RBC-ENTMCNC: 29.4 PG — SIGNIFICANT CHANGE UP (ref 27–34)
MCHC RBC-ENTMCNC: 29.8 PG — SIGNIFICANT CHANGE UP (ref 27–34)
MCHC RBC-ENTMCNC: 29.8 PG — SIGNIFICANT CHANGE UP (ref 27–34)
MCHC RBC-ENTMCNC: 34.1 GM/DL — SIGNIFICANT CHANGE UP (ref 32–36)
MCHC RBC-ENTMCNC: 34.1 GM/DL — SIGNIFICANT CHANGE UP (ref 32–36)
MCHC RBC-ENTMCNC: 34.9 GM/DL — SIGNIFICANT CHANGE UP (ref 32–36)
MCHC RBC-ENTMCNC: 34.9 GM/DL — SIGNIFICANT CHANGE UP (ref 32–36)
MCV RBC AUTO: 85.4 FL — SIGNIFICANT CHANGE UP (ref 80–100)
MCV RBC AUTO: 85.4 FL — SIGNIFICANT CHANGE UP (ref 80–100)
MCV RBC AUTO: 86.2 FL — SIGNIFICANT CHANGE UP (ref 80–100)
MCV RBC AUTO: 86.2 FL — SIGNIFICANT CHANGE UP (ref 80–100)
METHADONE UR-MCNC: NEGATIVE — SIGNIFICANT CHANGE UP
METHADONE UR-MCNC: NEGATIVE — SIGNIFICANT CHANGE UP
MONOCYTES # BLD AUTO: 0.29 K/UL — SIGNIFICANT CHANGE UP (ref 0–0.9)
MONOCYTES # BLD AUTO: 0.29 K/UL — SIGNIFICANT CHANGE UP (ref 0–0.9)
MONOCYTES # BLD AUTO: 0.74 K/UL — SIGNIFICANT CHANGE UP (ref 0–0.9)
MONOCYTES # BLD AUTO: 0.74 K/UL — SIGNIFICANT CHANGE UP (ref 0–0.9)
MONOCYTES NFR BLD AUTO: 3 % — SIGNIFICANT CHANGE UP (ref 2–14)
MONOCYTES NFR BLD AUTO: 3 % — SIGNIFICANT CHANGE UP (ref 2–14)
MONOCYTES NFR BLD AUTO: 8.9 % — SIGNIFICANT CHANGE UP (ref 2–14)
MONOCYTES NFR BLD AUTO: 8.9 % — SIGNIFICANT CHANGE UP (ref 2–14)
NEUTROPHILS # BLD AUTO: 6.12 K/UL — SIGNIFICANT CHANGE UP (ref 1.8–7.4)
NEUTROPHILS # BLD AUTO: 6.12 K/UL — SIGNIFICANT CHANGE UP (ref 1.8–7.4)
NEUTROPHILS # BLD AUTO: 8.05 K/UL — HIGH (ref 1.8–7.4)
NEUTROPHILS # BLD AUTO: 8.05 K/UL — HIGH (ref 1.8–7.4)
NEUTROPHILS NFR BLD AUTO: 73.9 % — SIGNIFICANT CHANGE UP (ref 43–77)
NEUTROPHILS NFR BLD AUTO: 73.9 % — SIGNIFICANT CHANGE UP (ref 43–77)
NEUTROPHILS NFR BLD AUTO: 84.7 % — HIGH (ref 43–77)
NEUTROPHILS NFR BLD AUTO: 84.7 % — HIGH (ref 43–77)
NITRITE UR-MCNC: NEGATIVE — SIGNIFICANT CHANGE UP
NITRITE UR-MCNC: NEGATIVE — SIGNIFICANT CHANGE UP
NRBC # BLD: 0 /100 WBCS — SIGNIFICANT CHANGE UP (ref 0–0)
NRBC # FLD: 0 K/UL — SIGNIFICANT CHANGE UP (ref 0–0)
OPIATES UR-MCNC: SIGNIFICANT CHANGE UP
OPIATES UR-MCNC: SIGNIFICANT CHANGE UP
OXYCODONE UR-MCNC: NEGATIVE — SIGNIFICANT CHANGE UP
OXYCODONE UR-MCNC: NEGATIVE — SIGNIFICANT CHANGE UP
PCP SPEC-MCNC: SIGNIFICANT CHANGE UP
PCP SPEC-MCNC: SIGNIFICANT CHANGE UP
PCP UR-MCNC: NEGATIVE — SIGNIFICANT CHANGE UP
PCP UR-MCNC: NEGATIVE — SIGNIFICANT CHANGE UP
PH UR: 7 — SIGNIFICANT CHANGE UP (ref 5–8)
PH UR: 7 — SIGNIFICANT CHANGE UP (ref 5–8)
PLATELET # BLD AUTO: 210 K/UL — SIGNIFICANT CHANGE UP (ref 150–400)
PLATELET # BLD AUTO: 210 K/UL — SIGNIFICANT CHANGE UP (ref 150–400)
PLATELET # BLD AUTO: 218 K/UL — SIGNIFICANT CHANGE UP (ref 150–400)
PLATELET # BLD AUTO: 218 K/UL — SIGNIFICANT CHANGE UP (ref 150–400)
POTASSIUM SERPL-MCNC: 4 MMOL/L — SIGNIFICANT CHANGE UP (ref 3.5–5.3)
POTASSIUM SERPL-MCNC: 4 MMOL/L — SIGNIFICANT CHANGE UP (ref 3.5–5.3)
POTASSIUM SERPL-MCNC: 4.9 MMOL/L — SIGNIFICANT CHANGE UP (ref 3.5–5.3)
POTASSIUM SERPL-MCNC: 4.9 MMOL/L — SIGNIFICANT CHANGE UP (ref 3.5–5.3)
POTASSIUM SERPL-SCNC: 4 MMOL/L — SIGNIFICANT CHANGE UP (ref 3.5–5.3)
POTASSIUM SERPL-SCNC: 4 MMOL/L — SIGNIFICANT CHANGE UP (ref 3.5–5.3)
POTASSIUM SERPL-SCNC: 4.9 MMOL/L — SIGNIFICANT CHANGE UP (ref 3.5–5.3)
POTASSIUM SERPL-SCNC: 4.9 MMOL/L — SIGNIFICANT CHANGE UP (ref 3.5–5.3)
PROT ?TM UR-MCNC: 62 MG/DL — SIGNIFICANT CHANGE UP
PROT ?TM UR-MCNC: 62 MG/DL — SIGNIFICANT CHANGE UP
PROT SERPL-MCNC: 6.8 G/DL — SIGNIFICANT CHANGE UP (ref 6–8.3)
PROT UR-MCNC: 100 MG/DL
PROT UR-MCNC: 100 MG/DL
PROT/CREAT UR-RTO: 0.4 RATIO — HIGH (ref 0–0.2)
PROT/CREAT UR-RTO: 0.4 RATIO — HIGH (ref 0–0.2)
PROTHROM AB SERPL-ACNC: 10 SEC — SIGNIFICANT CHANGE UP (ref 9.5–13)
PROTHROM AB SERPL-ACNC: 10 SEC — SIGNIFICANT CHANGE UP (ref 9.5–13)
RBC # BLD: 4.03 M/UL — SIGNIFICANT CHANGE UP (ref 3.8–5.2)
RBC # BLD: 4.03 M/UL — SIGNIFICANT CHANGE UP (ref 3.8–5.2)
RBC # BLD: 4.56 M/UL — SIGNIFICANT CHANGE UP (ref 3.8–5.2)
RBC # BLD: 4.56 M/UL — SIGNIFICANT CHANGE UP (ref 3.8–5.2)
RBC # FLD: 13.6 % — SIGNIFICANT CHANGE UP (ref 10.3–14.5)
RBC # FLD: 13.6 % — SIGNIFICANT CHANGE UP (ref 10.3–14.5)
RBC # FLD: 13.7 % — SIGNIFICANT CHANGE UP (ref 10.3–14.5)
RBC # FLD: 13.7 % — SIGNIFICANT CHANGE UP (ref 10.3–14.5)
RBC CASTS # UR COMP ASSIST: 5 /HPF — HIGH (ref 0–4)
RBC CASTS # UR COMP ASSIST: 5 /HPF — HIGH (ref 0–4)
REVIEW: SIGNIFICANT CHANGE UP
REVIEW: SIGNIFICANT CHANGE UP
RH IG SCN BLD-IMP: POSITIVE — SIGNIFICANT CHANGE UP
RUBV IGG SER-ACNC: 2.4 INDEX — SIGNIFICANT CHANGE UP
RUBV IGG SER-ACNC: 2.4 INDEX — SIGNIFICANT CHANGE UP
RUBV IGG SER-IMP: POSITIVE — SIGNIFICANT CHANGE UP
RUBV IGG SER-IMP: POSITIVE — SIGNIFICANT CHANGE UP
SODIUM SERPL-SCNC: 133 MMOL/L — LOW (ref 135–145)
SODIUM SERPL-SCNC: 133 MMOL/L — LOW (ref 135–145)
SODIUM SERPL-SCNC: 137 MMOL/L — SIGNIFICANT CHANGE UP (ref 135–145)
SODIUM SERPL-SCNC: 137 MMOL/L — SIGNIFICANT CHANGE UP (ref 135–145)
SP GR SPEC: 1.02 — SIGNIFICANT CHANGE UP (ref 1–1.03)
SP GR SPEC: 1.02 — SIGNIFICANT CHANGE UP (ref 1–1.03)
SQUAMOUS # UR AUTO: 20 /HPF — HIGH (ref 0–5)
SQUAMOUS # UR AUTO: 20 /HPF — HIGH (ref 0–5)
T PALLIDUM AB TITR SER: NEGATIVE — SIGNIFICANT CHANGE UP
T PALLIDUM AB TITR SER: NEGATIVE — SIGNIFICANT CHANGE UP
THC UR QL: NEGATIVE — SIGNIFICANT CHANGE UP
THC UR QL: NEGATIVE — SIGNIFICANT CHANGE UP
URATE SERPL-MCNC: 5 MG/DL — SIGNIFICANT CHANGE UP (ref 2.5–7)
URATE SERPL-MCNC: 5 MG/DL — SIGNIFICANT CHANGE UP (ref 2.5–7)
URATE SERPL-MCNC: 5.2 MG/DL — SIGNIFICANT CHANGE UP (ref 2.5–7)
URATE SERPL-MCNC: 5.2 MG/DL — SIGNIFICANT CHANGE UP (ref 2.5–7)
UROBILINOGEN FLD QL: 0.2 MG/DL — SIGNIFICANT CHANGE UP (ref 0.2–1)
UROBILINOGEN FLD QL: 0.2 MG/DL — SIGNIFICANT CHANGE UP (ref 0.2–1)
WBC # BLD: 8.28 K/UL — SIGNIFICANT CHANGE UP (ref 3.8–10.5)
WBC # BLD: 8.28 K/UL — SIGNIFICANT CHANGE UP (ref 3.8–10.5)
WBC # BLD: 9.51 K/UL — SIGNIFICANT CHANGE UP (ref 3.8–10.5)
WBC # BLD: 9.51 K/UL — SIGNIFICANT CHANGE UP (ref 3.8–10.5)
WBC # FLD AUTO: 8.28 K/UL — SIGNIFICANT CHANGE UP (ref 3.8–10.5)
WBC # FLD AUTO: 8.28 K/UL — SIGNIFICANT CHANGE UP (ref 3.8–10.5)
WBC # FLD AUTO: 9.51 K/UL — SIGNIFICANT CHANGE UP (ref 3.8–10.5)
WBC # FLD AUTO: 9.51 K/UL — SIGNIFICANT CHANGE UP (ref 3.8–10.5)
WBC UR QL: 8 /HPF — HIGH (ref 0–5)
WBC UR QL: 8 /HPF — HIGH (ref 0–5)

## 2023-11-27 RX ORDER — OXYTOCIN 10 UNIT/ML
333.33 VIAL (ML) INJECTION
Qty: 20 | Refills: 0 | Status: COMPLETED | OUTPATIENT
Start: 2023-11-27 | End: 2023-11-27

## 2023-11-27 RX ORDER — MAGNESIUM SULFATE 500 MG/ML
4 VIAL (ML) INJECTION ONCE
Refills: 0 | Status: COMPLETED | OUTPATIENT
Start: 2023-11-27 | End: 2023-11-27

## 2023-11-27 RX ORDER — NIFEDIPINE 30 MG
30 TABLET, EXTENDED RELEASE 24 HR ORAL DAILY
Refills: 0 | Status: DISCONTINUED | OUTPATIENT
Start: 2023-11-27 | End: 2023-11-27

## 2023-11-27 RX ORDER — CHLORHEXIDINE GLUCONATE 213 G/1000ML
1 SOLUTION TOPICAL DAILY
Refills: 0 | Status: DISCONTINUED | OUTPATIENT
Start: 2023-11-27 | End: 2023-11-28

## 2023-11-27 RX ORDER — SODIUM CHLORIDE 9 MG/ML
1000 INJECTION INTRAMUSCULAR; INTRAVENOUS; SUBCUTANEOUS
Refills: 0 | Status: DISCONTINUED | OUTPATIENT
Start: 2023-11-27 | End: 2023-11-29

## 2023-11-27 RX ORDER — MAGNESIUM SULFATE 500 MG/ML
2 VIAL (ML) INJECTION
Qty: 40 | Refills: 0 | Status: DISCONTINUED | OUTPATIENT
Start: 2023-11-27 | End: 2023-11-28

## 2023-11-27 RX ORDER — SODIUM CHLORIDE 9 MG/ML
1000 INJECTION, SOLUTION INTRAVENOUS
Refills: 0 | Status: DISCONTINUED | OUTPATIENT
Start: 2023-11-27 | End: 2023-11-28

## 2023-11-27 RX ORDER — SODIUM CHLORIDE 9 MG/ML
300 INJECTION INTRAMUSCULAR; INTRAVENOUS; SUBCUTANEOUS ONCE
Refills: 0 | Status: COMPLETED | OUTPATIENT
Start: 2023-11-27 | End: 2023-11-27

## 2023-11-27 RX ORDER — OXYTOCIN 10 UNIT/ML
10 VIAL (ML) INJECTION
Qty: 30 | Refills: 0 | Status: DISCONTINUED | OUTPATIENT
Start: 2023-11-27 | End: 2023-11-29

## 2023-11-27 RX ORDER — NIFEDIPINE 30 MG
10 TABLET, EXTENDED RELEASE 24 HR ORAL ONCE
Refills: 0 | Status: COMPLETED | OUTPATIENT
Start: 2023-11-27 | End: 2023-11-27

## 2023-11-27 RX ORDER — SODIUM CHLORIDE 9 MG/ML
1000 INJECTION, SOLUTION INTRAVENOUS ONCE
Refills: 0 | Status: DISCONTINUED | OUTPATIENT
Start: 2023-11-27 | End: 2023-11-28

## 2023-11-27 RX ORDER — SODIUM CHLORIDE 9 MG/ML
500 INJECTION, SOLUTION INTRAVENOUS ONCE
Refills: 0 | Status: DISCONTINUED | OUTPATIENT
Start: 2023-11-27 | End: 2023-11-28

## 2023-11-27 RX ORDER — INFLUENZA VIRUS VACCINE 15; 15; 15; 15 UG/.5ML; UG/.5ML; UG/.5ML; UG/.5ML
0.5 SUSPENSION INTRAMUSCULAR ONCE
Refills: 0 | Status: DISCONTINUED | OUTPATIENT
Start: 2023-11-27 | End: 2023-11-30

## 2023-11-27 RX ORDER — OXYTOCIN 10 UNIT/ML
VIAL (ML) INJECTION
Qty: 30 | Refills: 0 | Status: DISCONTINUED | OUTPATIENT
Start: 2023-11-27 | End: 2023-11-27

## 2023-11-27 RX ORDER — CITRIC ACID/SODIUM CITRATE 300-500 MG
15 SOLUTION, ORAL ORAL EVERY 6 HOURS
Refills: 0 | Status: DISCONTINUED | OUTPATIENT
Start: 2023-11-27 | End: 2023-11-28

## 2023-11-27 RX ADMIN — Medication 50 GM/HR: at 19:16

## 2023-11-27 RX ADMIN — Medication 50 GM/HR: at 09:17

## 2023-11-27 RX ADMIN — Medication 300 GRAM(S): at 08:43

## 2023-11-27 RX ADMIN — Medication 10 MILLIGRAM(S): at 08:39

## 2023-11-27 RX ADMIN — Medication 0.25 MILLIGRAM(S): at 17:12

## 2023-11-27 RX ADMIN — CHLORHEXIDINE GLUCONATE 1 APPLICATION(S): 213 SOLUTION TOPICAL at 13:40

## 2023-11-27 RX ADMIN — SODIUM CHLORIDE 600 MILLILITER(S): 9 INJECTION INTRAMUSCULAR; INTRAVENOUS; SUBCUTANEOUS at 16:58

## 2023-11-27 RX ADMIN — Medication 10 MILLIUNIT(S)/MIN: at 18:12

## 2023-11-27 RX ADMIN — SODIUM CHLORIDE 125 MILLILITER(S): 9 INJECTION INTRAMUSCULAR; INTRAVENOUS; SUBCUTANEOUS at 17:12

## 2023-11-27 RX ADMIN — SODIUM CHLORIDE 125 MILLILITER(S): 9 INJECTION, SOLUTION INTRAVENOUS at 08:42

## 2023-11-27 RX ADMIN — Medication 2 MILLIUNIT(S)/MIN: at 11:18

## 2023-11-27 NOTE — OB PROVIDER H&P - NSOBVTERISKASSESS_OBGYN_ALL_OB_FT
Patient calling in with worsening covid symptoms stating that he has been taking dayquil, mucinex and tylenol without relief and he is not sure what to do now   Some additional home care advice given to patient and patient scheduled for a virtual appointment today at 11:30 OBAntePartum Assessment Completed on: 27-Nov-2023 06:32

## 2023-11-27 NOTE — OB PROVIDER H&P - NSLOWPPHRISK_OBGYN_A_OB
No previous uterine incision/Musa Pregnancy/Less than or equal to 4 previous vaginal births/No known bleeding disorder/No history of postpartum hemorrhage/No other PPH risks indicated

## 2023-11-27 NOTE — OB PROVIDER TRIAGE NOTE - NS_OBGYNHISTORY_OBGYN_ALL_OB_FT
GYN hx: denies hx of abnormal papsmear/cysts/fibroids/STDs GYN hx: denies hx of abnormal papsmear/cysts/fibroids/STDs  SABx2, D&Cx1

## 2023-11-27 NOTE — OB PROVIDER LABOR PROGRESS NOTE - ASSESSMENT
@39.4wks sPEC/Mg  sp SROM @8a  VE unchanged  Patient repositioned with peanut ball  Consider decreasing pitocin if late decelerations persist  FHRT overall reassuring with moderate variability, accels, and intermittent late decelerations   Cont IUPC with amnioinfusion   Cont magnesium and BP monitoring  Cont fetal and maternal monitoring  Will reassess in 2-3 hrs or sooner if clinically indiciated    dw MD Banuelos and PGY 3 Clive Contreras NP

## 2023-11-27 NOTE — OB PROVIDER LABOR PROGRESS NOTE - ASSESSMENT
A/P:   27y  @ 39.4wga admitted in early labor with labile BPs     #Labor   - SROM of clear fluid confirmed  - Consider for Oxytocin pending contraction pattern     #Fetal Wellbeing   - Cat 2. Tracing nevertheless overall reassuring w/ moderate variability    # Issues   - Patient has had several non-sustained severe range BPs. HELLP labs have been reassuring. UA and P/C are pending  - See note by MIGNON Kinney regarding sustained severe range BPs     #Pain Control   - Did not desire an epidural at time of evaluation     Lenin Hinton, PGY-2

## 2023-11-27 NOTE — OB PROVIDER TRIAGE NOTE - HISTORY OF PRESENT ILLNESS
28 yo , EGA@39.4 weeks, presented to D&T with c/o contractions irregular, every 3-4 minutes since 0230A, Pain 7/10, denies vaginal bleeding, leakage of fluid, and reports positive fetal movement.  D  Prenatal care with [facility] care with [facility]  Prenatal course is uncomplicated as per patient    GBS status is negative as per patient  Meds: PNV, ion, Vit C  Allergies: NKDA

## 2023-11-27 NOTE — OB PROVIDER H&P - PRO INTERPRETER NEED 2
How Severe Are They?: moderate Is This A New Presentation, Or A Follow-Up?: Follow Up Irritated Seborrheic Keratoses English

## 2023-11-27 NOTE — OB PROVIDER LABOR PROGRESS NOTE - ASSESSMENT
A/P: 28y/o  @39w4d IOL for sPEC  - Labor: SROM@8a, Pitocin@1120a  - Fetus: category 2, reassuring  - GBS: negative  - Pain: epidural, well controlled.    IUPC placed and amnioinfusion started for recurrent variables. Inconsistent contraction pattern.     Joann Arizmendi, PGY1  d/w Dr. Sewell

## 2023-11-27 NOTE — OB RN PATIENT PROFILE - NS_ADMITVITALS_OBGYN_ALL_OB_DT
Anesthesia Evaluation     Patient summary reviewed and Nursing notes reviewed   NPO Solid Status: > 8 hours  NPO Liquid Status: > 2 hours           Airway   Mallampati: I  TM distance: >3 FB  Neck ROM: full  Dental    (+) poor dentition    Pulmonary    (+) pneumonia ,shortness of breath, decreased breath sounds  Cardiovascular   Exercise tolerance: poor (<4 METS)    ECG reviewed  Rhythm: regular  Rate: normal    (+) hypertension, hyperlipidemia    ROS comment: EKG AUGUST 2021  NORMAL ECG  Sinus rhythm  When compared with ECG of 10-Jul-2021 7:51:43,  No significant change    ECHO AUGUST 2021  · Left ventricular ejection fraction appears to be 61 - 65%.  · Left ventricular diastolic function was normal.  · No evidence of valvular disfunction      Neuro/Psych    ROS Comment: AVM  GI/Hepatic/Renal/Endo      Musculoskeletal (-) negative ROS    Abdominal    Substance History      OB/GYN negative ob/gyn ROS         Other - negative ROS                   Anesthesia Plan    ASA 2     general     (Total IV Anesthesia    Patient understands anesthesia not responsible for dental damage.  )  intravenous induction     Anesthetic plan, risks, benefits, and alternatives have been provided, discussed and informed consent has been obtained with: patient and other.  Pre-procedure education provided  Plan discussed with CRNA.    CODE STATUS:         
27-Nov-2023 06:45

## 2023-11-27 NOTE — OB PROVIDER H&P - NSHPPHYSICALEXAM_GEN_ALL_CORE
Vital Signs Last 24 Hrs  T(C): 36.9 (27 Nov 2023 05:15), Max: 36.9 (27 Nov 2023 05:03)  T(F): 98.42 (27 Nov 2023 05:15), Max: 98.42 (27 Nov 2023 05:15)  HR: 65 (27 Nov 2023 06:06) (60 - 66)  BP: 138/90 (27 Nov 2023 06:06) (138/90 - 163/90)  BP(mean): --  RR: 16 (27 Nov 2023 05:03) (16 - 16)  SpO2: --    Gen: NAD  Head: NC/AT  Cardio: S1S2+, RRR  Resp: CTABL, no wheezing  Abdomen: Soft, NT/ND, BS+  Extremities: No LE edema bilaterally    NST-->FHR: 135 HR baseline, moderate variability, accelerations present, no decelerations, Category 1.  [place]: Contractions present, irregular,  TAUS: Cephalic presentation, Anterior placenta. M-mode 148bpm- saved in ASOB  SVE: 1.5/50/-3

## 2023-11-27 NOTE — OB PROVIDER H&P - HISTORY OF PRESENT ILLNESS
26 yo , EGA@39.4 weeks, presented to D&T with c/o contractions irregular, every 3-4 minutes since 0230A, Pain 7/10, denies vaginal bleeding, leakage of fluid, and reports positive fetal movement.  D  Prenatal care with [facility] care with [facility]  Prenatal course is uncomplicated as per patient    GBS status is negative as per patient  Meds: PNV, ion, Vit C  Allergies: NKDA

## 2023-11-27 NOTE — OB PROVIDER TRIAGE NOTE - NSOBPROVIDERNOTE_OBGYN_ALL_OB_FT
28 yo , EGA@39.4 weeks, early labor Vs. PEC  Pt with lebile blood pressure and 1 severe blood pressure   HELLP lab sent pending result.  [id number] discuss with Dr. Duffy PGY-3  Patient admitted for early labor and pain management  For expectant management at this time  VE in 2 hrs.   For epi PRN  routine orders  meds ordered  consents signed by patient.    ANAN MARIE kinney NP

## 2023-11-27 NOTE — OB RN TRIAGE NOTE - FALL HARM RISK - UNIVERSAL INTERVENTIONS
Bed in lowest position, wheels locked, appropriate side rails in place/Call bell, personal items and telephone in reach/Instruct patient to call for assistance before getting out of bed or chair/Non-slip footwear when patient is out of bed/[place] to call system/Physically safe environment - no spills, clutter or unnecessary equipment/Purposeful Proactive Rounding/Room/bathroom lighting operational, light cord in reach

## 2023-11-27 NOTE — OB PROVIDER LABOR PROGRESS NOTE - ASSESSMENT
27 y.o  @ 39w4d IOL for sPEC with category II tracing    -Pitocin discontinued. Resuscitative measures with maternal repositioning, oxygen, and IV fluids.  -ISE placed. VE unchanged  -terb 0.25mg SQ x1 given  -Dr. Sewell and Dr. Banuelos at bedside  -Recovery of FHR to baseline noted  -Amnioinfusion continues  -continue to monitor FHR tracing. Consider restarting Pitocin if tracing allows    Shira Dior NP 27 y.o  @ 39w4d IOL for sPEC with category II tracing    -Pitocin discontinued. Resuscitative measures with maternal repositioning, oxygen, and IV fluids.  -ISE placed. VE unchanged  -terb 0.25mg SQ x1 given  -Dr. Sewell and Dr. Banuelos at bedside  -Recovery of FHR to baseline noted  -Amnioinfusion continues  -continue to monitor FHR tracing. Consider restarting Pitocin @ 10mu/min when tracing allows    Shira Dior NP

## 2023-11-27 NOTE — OB PROVIDER H&P - ASSESSMENT
26 yo , EGA@39.4 weeks, early labor Vs. PEC  Pt with lebile blood pressure and 1 severe blood pressure   HELLP lab sent pending result.  [id number] discuss with Dr. Duffy PGY-3  Patient admitted for early labor and pain management  For expectant management at this time  VE in 2 hrs @ 0750  For epi PRN  routine orders  meds ordered  consents signed by patient.    ANNA MARIE kinney NP

## 2023-11-28 LAB
ALBUMIN SERPL ELPH-MCNC: 3 G/DL — LOW (ref 3.3–5)
ALBUMIN SERPL ELPH-MCNC: 3 G/DL — LOW (ref 3.3–5)
ALBUMIN SERPL ELPH-MCNC: 3.3 G/DL — SIGNIFICANT CHANGE UP (ref 3.3–5)
ALBUMIN SERPL ELPH-MCNC: 3.3 G/DL — SIGNIFICANT CHANGE UP (ref 3.3–5)
ALBUMIN SERPL ELPH-MCNC: 3.8 G/DL — SIGNIFICANT CHANGE UP (ref 3.3–5)
ALBUMIN SERPL ELPH-MCNC: 3.8 G/DL — SIGNIFICANT CHANGE UP (ref 3.3–5)
ALP SERPL-CCNC: 100 U/L — SIGNIFICANT CHANGE UP (ref 40–120)
ALP SERPL-CCNC: 100 U/L — SIGNIFICANT CHANGE UP (ref 40–120)
ALP SERPL-CCNC: 104 U/L — SIGNIFICANT CHANGE UP (ref 40–120)
ALP SERPL-CCNC: 104 U/L — SIGNIFICANT CHANGE UP (ref 40–120)
ALP SERPL-CCNC: 126 U/L — HIGH (ref 40–120)
ALP SERPL-CCNC: 126 U/L — HIGH (ref 40–120)
ALT FLD-CCNC: 10 U/L — SIGNIFICANT CHANGE UP (ref 4–33)
ALT FLD-CCNC: 10 U/L — SIGNIFICANT CHANGE UP (ref 4–33)
ALT FLD-CCNC: 11 U/L — SIGNIFICANT CHANGE UP (ref 4–33)
ALT FLD-CCNC: 11 U/L — SIGNIFICANT CHANGE UP (ref 4–33)
ALT FLD-CCNC: 15 U/L — SIGNIFICANT CHANGE UP (ref 4–33)
ALT FLD-CCNC: 15 U/L — SIGNIFICANT CHANGE UP (ref 4–33)
AMPHET UR-MCNC: NEGATIVE — SIGNIFICANT CHANGE UP
AMPHET UR-MCNC: NEGATIVE — SIGNIFICANT CHANGE UP
ANION GAP SERPL CALC-SCNC: 11 MMOL/L — SIGNIFICANT CHANGE UP (ref 7–14)
ANION GAP SERPL CALC-SCNC: 11 MMOL/L — SIGNIFICANT CHANGE UP (ref 7–14)
ANION GAP SERPL CALC-SCNC: 12 MMOL/L — SIGNIFICANT CHANGE UP (ref 7–14)
ANION GAP SERPL CALC-SCNC: 12 MMOL/L — SIGNIFICANT CHANGE UP (ref 7–14)
ANION GAP SERPL CALC-SCNC: 16 MMOL/L — HIGH (ref 7–14)
ANION GAP SERPL CALC-SCNC: 16 MMOL/L — HIGH (ref 7–14)
APTT BLD: 25.2 SEC — SIGNIFICANT CHANGE UP (ref 24.5–35.6)
APTT BLD: 25.2 SEC — SIGNIFICANT CHANGE UP (ref 24.5–35.6)
APTT BLD: 26.6 SEC — SIGNIFICANT CHANGE UP (ref 24.5–35.6)
AST SERPL-CCNC: 15 U/L — SIGNIFICANT CHANGE UP (ref 4–32)
AST SERPL-CCNC: 15 U/L — SIGNIFICANT CHANGE UP (ref 4–32)
AST SERPL-CCNC: 22 U/L — SIGNIFICANT CHANGE UP (ref 4–32)
AST SERPL-CCNC: 22 U/L — SIGNIFICANT CHANGE UP (ref 4–32)
AST SERPL-CCNC: 23 U/L — SIGNIFICANT CHANGE UP (ref 4–32)
AST SERPL-CCNC: 23 U/L — SIGNIFICANT CHANGE UP (ref 4–32)
BARBITURATES UR SCN-MCNC: NEGATIVE — SIGNIFICANT CHANGE UP
BARBITURATES UR SCN-MCNC: NEGATIVE — SIGNIFICANT CHANGE UP
BASOPHILS # BLD AUTO: 0.02 K/UL — SIGNIFICANT CHANGE UP (ref 0–0.2)
BASOPHILS NFR BLD AUTO: 0.1 % — SIGNIFICANT CHANGE UP (ref 0–2)
BASOPHILS NFR BLD AUTO: 0.2 % — SIGNIFICANT CHANGE UP (ref 0–2)
BASOPHILS NFR BLD AUTO: 0.2 % — SIGNIFICANT CHANGE UP (ref 0–2)
BENZODIAZ UR-MCNC: NEGATIVE — SIGNIFICANT CHANGE UP
BENZODIAZ UR-MCNC: NEGATIVE — SIGNIFICANT CHANGE UP
BILIRUB SERPL-MCNC: 0.3 MG/DL — SIGNIFICANT CHANGE UP (ref 0.2–1.2)
BILIRUB SERPL-MCNC: 0.3 MG/DL — SIGNIFICANT CHANGE UP (ref 0.2–1.2)
BILIRUB SERPL-MCNC: 0.5 MG/DL — SIGNIFICANT CHANGE UP (ref 0.2–1.2)
BUN SERPL-MCNC: 7 MG/DL — SIGNIFICANT CHANGE UP (ref 7–23)
BUN SERPL-MCNC: 7 MG/DL — SIGNIFICANT CHANGE UP (ref 7–23)
BUN SERPL-MCNC: 8 MG/DL — SIGNIFICANT CHANGE UP (ref 7–23)
CALCIUM SERPL-MCNC: 7.4 MG/DL — LOW (ref 8.4–10.5)
CALCIUM SERPL-MCNC: 7.4 MG/DL — LOW (ref 8.4–10.5)
CALCIUM SERPL-MCNC: 7.5 MG/DL — LOW (ref 8.4–10.5)
CALCIUM SERPL-MCNC: 7.5 MG/DL — LOW (ref 8.4–10.5)
CALCIUM SERPL-MCNC: 8 MG/DL — LOW (ref 8.4–10.5)
CALCIUM SERPL-MCNC: 8 MG/DL — LOW (ref 8.4–10.5)
CHLORIDE SERPL-SCNC: 101 MMOL/L — SIGNIFICANT CHANGE UP (ref 98–107)
CHLORIDE SERPL-SCNC: 101 MMOL/L — SIGNIFICANT CHANGE UP (ref 98–107)
CHLORIDE SERPL-SCNC: 96 MMOL/L — LOW (ref 98–107)
CHLORIDE SERPL-SCNC: 96 MMOL/L — LOW (ref 98–107)
CHLORIDE SERPL-SCNC: 99 MMOL/L — SIGNIFICANT CHANGE UP (ref 98–107)
CHLORIDE SERPL-SCNC: 99 MMOL/L — SIGNIFICANT CHANGE UP (ref 98–107)
CO2 SERPL-SCNC: 17 MMOL/L — LOW (ref 22–31)
CO2 SERPL-SCNC: 17 MMOL/L — LOW (ref 22–31)
CO2 SERPL-SCNC: 19 MMOL/L — LOW (ref 22–31)
CO2 SERPL-SCNC: 19 MMOL/L — LOW (ref 22–31)
CO2 SERPL-SCNC: 20 MMOL/L — LOW (ref 22–31)
CO2 SERPL-SCNC: 20 MMOL/L — LOW (ref 22–31)
COCAINE METAB.OTHER UR-MCNC: NEGATIVE — SIGNIFICANT CHANGE UP
COCAINE METAB.OTHER UR-MCNC: NEGATIVE — SIGNIFICANT CHANGE UP
CREAT SERPL-MCNC: 0.59 MG/DL — SIGNIFICANT CHANGE UP (ref 0.5–1.3)
CREAT SERPL-MCNC: 0.59 MG/DL — SIGNIFICANT CHANGE UP (ref 0.5–1.3)
CREAT SERPL-MCNC: 0.97 MG/DL — SIGNIFICANT CHANGE UP (ref 0.5–1.3)
CREAT SERPL-MCNC: 0.97 MG/DL — SIGNIFICANT CHANGE UP (ref 0.5–1.3)
CREAT SERPL-MCNC: 1.04 MG/DL — SIGNIFICANT CHANGE UP (ref 0.5–1.3)
CREAT SERPL-MCNC: 1.04 MG/DL — SIGNIFICANT CHANGE UP (ref 0.5–1.3)
CREATININE URINE RESULT, DAU: 145 MG/DL — SIGNIFICANT CHANGE UP
CREATININE URINE RESULT, DAU: 145 MG/DL — SIGNIFICANT CHANGE UP
EGFR: 128 ML/MIN/1.73M2 — SIGNIFICANT CHANGE UP
EGFR: 128 ML/MIN/1.73M2 — SIGNIFICANT CHANGE UP
EGFR: 76 ML/MIN/1.73M2 — SIGNIFICANT CHANGE UP
EGFR: 76 ML/MIN/1.73M2 — SIGNIFICANT CHANGE UP
EGFR: 83 ML/MIN/1.73M2 — SIGNIFICANT CHANGE UP
EGFR: 83 ML/MIN/1.73M2 — SIGNIFICANT CHANGE UP
EOSINOPHIL # BLD AUTO: 0 K/UL — SIGNIFICANT CHANGE UP (ref 0–0.5)
EOSINOPHIL # BLD AUTO: 0.01 K/UL — SIGNIFICANT CHANGE UP (ref 0–0.5)
EOSINOPHIL # BLD AUTO: 0.01 K/UL — SIGNIFICANT CHANGE UP (ref 0–0.5)
EOSINOPHIL NFR BLD AUTO: 0 % — SIGNIFICANT CHANGE UP (ref 0–6)
EOSINOPHIL NFR BLD AUTO: 0.1 % — SIGNIFICANT CHANGE UP (ref 0–6)
EOSINOPHIL NFR BLD AUTO: 0.1 % — SIGNIFICANT CHANGE UP (ref 0–6)
FIBRINOGEN PPP-MCNC: 421 MG/DL — SIGNIFICANT CHANGE UP (ref 200–465)
FIBRINOGEN PPP-MCNC: 421 MG/DL — SIGNIFICANT CHANGE UP (ref 200–465)
FIBRINOGEN PPP-MCNC: 458 MG/DL — SIGNIFICANT CHANGE UP (ref 200–465)
FIBRINOGEN PPP-MCNC: 458 MG/DL — SIGNIFICANT CHANGE UP (ref 200–465)
FIBRINOGEN PPP-MCNC: 621 MG/DL — HIGH (ref 200–465)
FIBRINOGEN PPP-MCNC: 621 MG/DL — HIGH (ref 200–465)
GLUCOSE SERPL-MCNC: 121 MG/DL — HIGH (ref 70–99)
GLUCOSE SERPL-MCNC: 121 MG/DL — HIGH (ref 70–99)
GLUCOSE SERPL-MCNC: 91 MG/DL — SIGNIFICANT CHANGE UP (ref 70–99)
GLUCOSE SERPL-MCNC: 91 MG/DL — SIGNIFICANT CHANGE UP (ref 70–99)
GLUCOSE SERPL-MCNC: 95 MG/DL — SIGNIFICANT CHANGE UP (ref 70–99)
GLUCOSE SERPL-MCNC: 95 MG/DL — SIGNIFICANT CHANGE UP (ref 70–99)
HCT VFR BLD CALC: 32.4 % — LOW (ref 34.5–45)
HCT VFR BLD CALC: 32.4 % — LOW (ref 34.5–45)
HCT VFR BLD CALC: 33.4 % — LOW (ref 34.5–45)
HCT VFR BLD CALC: 33.4 % — LOW (ref 34.5–45)
HCT VFR BLD CALC: 38.9 % — SIGNIFICANT CHANGE UP (ref 34.5–45)
HCT VFR BLD CALC: 38.9 % — SIGNIFICANT CHANGE UP (ref 34.5–45)
HGB BLD-MCNC: 11.3 G/DL — LOW (ref 11.5–15.5)
HGB BLD-MCNC: 11.3 G/DL — LOW (ref 11.5–15.5)
HGB BLD-MCNC: 11.6 G/DL — SIGNIFICANT CHANGE UP (ref 11.5–15.5)
HGB BLD-MCNC: 11.6 G/DL — SIGNIFICANT CHANGE UP (ref 11.5–15.5)
HGB BLD-MCNC: 13 G/DL — SIGNIFICANT CHANGE UP (ref 11.5–15.5)
HGB BLD-MCNC: 13 G/DL — SIGNIFICANT CHANGE UP (ref 11.5–15.5)
IANC: 10.04 K/UL — HIGH (ref 1.8–7.4)
IANC: 10.04 K/UL — HIGH (ref 1.8–7.4)
IANC: 14.33 K/UL — HIGH (ref 1.8–7.4)
IANC: 14.33 K/UL — HIGH (ref 1.8–7.4)
IANC: 19.55 K/UL — HIGH (ref 1.8–7.4)
IANC: 19.55 K/UL — HIGH (ref 1.8–7.4)
IMM GRANULOCYTES NFR BLD AUTO: 0.3 % — SIGNIFICANT CHANGE UP (ref 0–0.9)
IMM GRANULOCYTES NFR BLD AUTO: 0.3 % — SIGNIFICANT CHANGE UP (ref 0–0.9)
IMM GRANULOCYTES NFR BLD AUTO: 0.5 % — SIGNIFICANT CHANGE UP (ref 0–0.9)
IMM GRANULOCYTES NFR BLD AUTO: 0.5 % — SIGNIFICANT CHANGE UP (ref 0–0.9)
IMM GRANULOCYTES NFR BLD AUTO: 1.1 % — HIGH (ref 0–0.9)
IMM GRANULOCYTES NFR BLD AUTO: 1.1 % — HIGH (ref 0–0.9)
INR BLD: 0.91 RATIO — SIGNIFICANT CHANGE UP (ref 0.85–1.18)
INR BLD: 0.91 RATIO — SIGNIFICANT CHANGE UP (ref 0.85–1.18)
INR BLD: <0.9 RATIO — SIGNIFICANT CHANGE UP (ref 0.85–1.18)
INR BLD: <0.9 RATIO — SIGNIFICANT CHANGE UP (ref 0.85–1.18)
LDH SERPL L TO P-CCNC: 175 U/L — SIGNIFICANT CHANGE UP (ref 135–225)
LDH SERPL L TO P-CCNC: 175 U/L — SIGNIFICANT CHANGE UP (ref 135–225)
LDH SERPL L TO P-CCNC: 281 U/L — HIGH (ref 135–225)
LDH SERPL L TO P-CCNC: 281 U/L — HIGH (ref 135–225)
LDH SERPL L TO P-CCNC: 283 U/L — HIGH (ref 135–225)
LDH SERPL L TO P-CCNC: 283 U/L — HIGH (ref 135–225)
LYMPHOCYTES # BLD AUTO: 0.38 K/UL — LOW (ref 1–3.3)
LYMPHOCYTES # BLD AUTO: 0.38 K/UL — LOW (ref 1–3.3)
LYMPHOCYTES # BLD AUTO: 0.53 K/UL — LOW (ref 1–3.3)
LYMPHOCYTES # BLD AUTO: 0.53 K/UL — LOW (ref 1–3.3)
LYMPHOCYTES # BLD AUTO: 0.77 K/UL — LOW (ref 1–3.3)
LYMPHOCYTES # BLD AUTO: 0.77 K/UL — LOW (ref 1–3.3)
LYMPHOCYTES # BLD AUTO: 1.8 % — LOW (ref 13–44)
LYMPHOCYTES # BLD AUTO: 1.8 % — LOW (ref 13–44)
LYMPHOCYTES # BLD AUTO: 3.3 % — LOW (ref 13–44)
LYMPHOCYTES # BLD AUTO: 3.3 % — LOW (ref 13–44)
LYMPHOCYTES # BLD AUTO: 6.6 % — LOW (ref 13–44)
LYMPHOCYTES # BLD AUTO: 6.6 % — LOW (ref 13–44)
MAGNESIUM SERPL-MCNC: 5.2 MG/DL — HIGH (ref 1.6–2.6)
MAGNESIUM SERPL-MCNC: 5.2 MG/DL — HIGH (ref 1.6–2.6)
MAGNESIUM SERPL-MCNC: 6.5 MG/DL — HIGH (ref 1.6–2.6)
MAGNESIUM SERPL-MCNC: 6.5 MG/DL — HIGH (ref 1.6–2.6)
MAGNESIUM SERPL-MCNC: 6.9 MG/DL — HIGH (ref 1.6–2.6)
MAGNESIUM SERPL-MCNC: 6.9 MG/DL — HIGH (ref 1.6–2.6)
MAGNESIUM SERPL-MCNC: 9.4 MG/DL — CRITICAL HIGH (ref 1.6–2.6)
MAGNESIUM SERPL-MCNC: 9.4 MG/DL — CRITICAL HIGH (ref 1.6–2.6)
MCHC RBC-ENTMCNC: 29.5 PG — SIGNIFICANT CHANGE UP (ref 27–34)
MCHC RBC-ENTMCNC: 29.5 PG — SIGNIFICANT CHANGE UP (ref 27–34)
MCHC RBC-ENTMCNC: 29.6 PG — SIGNIFICANT CHANGE UP (ref 27–34)
MCHC RBC-ENTMCNC: 29.6 PG — SIGNIFICANT CHANGE UP (ref 27–34)
MCHC RBC-ENTMCNC: 29.7 PG — SIGNIFICANT CHANGE UP (ref 27–34)
MCHC RBC-ENTMCNC: 29.7 PG — SIGNIFICANT CHANGE UP (ref 27–34)
MCHC RBC-ENTMCNC: 33.4 GM/DL — SIGNIFICANT CHANGE UP (ref 32–36)
MCHC RBC-ENTMCNC: 33.4 GM/DL — SIGNIFICANT CHANGE UP (ref 32–36)
MCHC RBC-ENTMCNC: 34.7 GM/DL — SIGNIFICANT CHANGE UP (ref 32–36)
MCHC RBC-ENTMCNC: 34.7 GM/DL — SIGNIFICANT CHANGE UP (ref 32–36)
MCHC RBC-ENTMCNC: 34.9 GM/DL — SIGNIFICANT CHANGE UP (ref 32–36)
MCHC RBC-ENTMCNC: 34.9 GM/DL — SIGNIFICANT CHANGE UP (ref 32–36)
MCV RBC AUTO: 85 FL — SIGNIFICANT CHANGE UP (ref 80–100)
MCV RBC AUTO: 85 FL — SIGNIFICANT CHANGE UP (ref 80–100)
MCV RBC AUTO: 85.2 FL — SIGNIFICANT CHANGE UP (ref 80–100)
MCV RBC AUTO: 85.2 FL — SIGNIFICANT CHANGE UP (ref 80–100)
MCV RBC AUTO: 88.4 FL — SIGNIFICANT CHANGE UP (ref 80–100)
MCV RBC AUTO: 88.4 FL — SIGNIFICANT CHANGE UP (ref 80–100)
METHADONE UR-MCNC: NEGATIVE — SIGNIFICANT CHANGE UP
METHADONE UR-MCNC: NEGATIVE — SIGNIFICANT CHANGE UP
MONOCYTES # BLD AUTO: 0.87 K/UL — SIGNIFICANT CHANGE UP (ref 0–0.9)
MONOCYTES # BLD AUTO: 0.87 K/UL — SIGNIFICANT CHANGE UP (ref 0–0.9)
MONOCYTES # BLD AUTO: 0.89 K/UL — SIGNIFICANT CHANGE UP (ref 0–0.9)
MONOCYTES # BLD AUTO: 0.89 K/UL — SIGNIFICANT CHANGE UP (ref 0–0.9)
MONOCYTES # BLD AUTO: 1.35 K/UL — HIGH (ref 0–0.9)
MONOCYTES # BLD AUTO: 1.35 K/UL — HIGH (ref 0–0.9)
MONOCYTES NFR BLD AUTO: 5.6 % — SIGNIFICANT CHANGE UP (ref 2–14)
MONOCYTES NFR BLD AUTO: 5.6 % — SIGNIFICANT CHANGE UP (ref 2–14)
MONOCYTES NFR BLD AUTO: 6.3 % — SIGNIFICANT CHANGE UP (ref 2–14)
MONOCYTES NFR BLD AUTO: 6.3 % — SIGNIFICANT CHANGE UP (ref 2–14)
MONOCYTES NFR BLD AUTO: 7.4 % — SIGNIFICANT CHANGE UP (ref 2–14)
MONOCYTES NFR BLD AUTO: 7.4 % — SIGNIFICANT CHANGE UP (ref 2–14)
NEUTROPHILS # BLD AUTO: 10.04 K/UL — HIGH (ref 1.8–7.4)
NEUTROPHILS # BLD AUTO: 10.04 K/UL — HIGH (ref 1.8–7.4)
NEUTROPHILS # BLD AUTO: 14.33 K/UL — HIGH (ref 1.8–7.4)
NEUTROPHILS # BLD AUTO: 14.33 K/UL — HIGH (ref 1.8–7.4)
NEUTROPHILS # BLD AUTO: 19.55 K/UL — HIGH (ref 1.8–7.4)
NEUTROPHILS # BLD AUTO: 19.55 K/UL — HIGH (ref 1.8–7.4)
NEUTROPHILS NFR BLD AUTO: 85.4 % — HIGH (ref 43–77)
NEUTROPHILS NFR BLD AUTO: 85.4 % — HIGH (ref 43–77)
NEUTROPHILS NFR BLD AUTO: 90.5 % — HIGH (ref 43–77)
NEUTROPHILS NFR BLD AUTO: 90.5 % — HIGH (ref 43–77)
NEUTROPHILS NFR BLD AUTO: 90.7 % — HIGH (ref 43–77)
NEUTROPHILS NFR BLD AUTO: 90.7 % — HIGH (ref 43–77)
NRBC # BLD: 0 /100 WBCS — SIGNIFICANT CHANGE UP (ref 0–0)
NRBC # FLD: 0 K/UL — SIGNIFICANT CHANGE UP (ref 0–0)
OPIATES UR-MCNC: POSITIVE
OPIATES UR-MCNC: POSITIVE
OXYCODONE UR-MCNC: NEGATIVE — SIGNIFICANT CHANGE UP
OXYCODONE UR-MCNC: NEGATIVE — SIGNIFICANT CHANGE UP
PCP SPEC-MCNC: SIGNIFICANT CHANGE UP
PCP SPEC-MCNC: SIGNIFICANT CHANGE UP
PCP UR-MCNC: NEGATIVE — SIGNIFICANT CHANGE UP
PCP UR-MCNC: NEGATIVE — SIGNIFICANT CHANGE UP
PLATELET # BLD AUTO: 193 K/UL — SIGNIFICANT CHANGE UP (ref 150–400)
PLATELET # BLD AUTO: 193 K/UL — SIGNIFICANT CHANGE UP (ref 150–400)
PLATELET # BLD AUTO: 221 K/UL — SIGNIFICANT CHANGE UP (ref 150–400)
PLATELET # BLD AUTO: 221 K/UL — SIGNIFICANT CHANGE UP (ref 150–400)
PLATELET # BLD AUTO: 226 K/UL — SIGNIFICANT CHANGE UP (ref 150–400)
PLATELET # BLD AUTO: 226 K/UL — SIGNIFICANT CHANGE UP (ref 150–400)
POTASSIUM SERPL-MCNC: 3.6 MMOL/L — SIGNIFICANT CHANGE UP (ref 3.5–5.3)
POTASSIUM SERPL-MCNC: 3.6 MMOL/L — SIGNIFICANT CHANGE UP (ref 3.5–5.3)
POTASSIUM SERPL-MCNC: 3.8 MMOL/L — SIGNIFICANT CHANGE UP (ref 3.5–5.3)
POTASSIUM SERPL-SCNC: 3.6 MMOL/L — SIGNIFICANT CHANGE UP (ref 3.5–5.3)
POTASSIUM SERPL-SCNC: 3.6 MMOL/L — SIGNIFICANT CHANGE UP (ref 3.5–5.3)
POTASSIUM SERPL-SCNC: 3.8 MMOL/L — SIGNIFICANT CHANGE UP (ref 3.5–5.3)
PROT SERPL-MCNC: 5.7 G/DL — LOW (ref 6–8.3)
PROT SERPL-MCNC: 5.7 G/DL — LOW (ref 6–8.3)
PROT SERPL-MCNC: 6.1 G/DL — SIGNIFICANT CHANGE UP (ref 6–8.3)
PROT SERPL-MCNC: 6.1 G/DL — SIGNIFICANT CHANGE UP (ref 6–8.3)
PROT SERPL-MCNC: 7.6 G/DL — SIGNIFICANT CHANGE UP (ref 6–8.3)
PROT SERPL-MCNC: 7.6 G/DL — SIGNIFICANT CHANGE UP (ref 6–8.3)
PROTHROM AB SERPL-ACNC: 10.3 SEC — SIGNIFICANT CHANGE UP (ref 9.5–13)
PROTHROM AB SERPL-ACNC: 10.3 SEC — SIGNIFICANT CHANGE UP (ref 9.5–13)
PROTHROM AB SERPL-ACNC: 9.8 SEC — SIGNIFICANT CHANGE UP (ref 9.5–13)
PROTHROM AB SERPL-ACNC: 9.8 SEC — SIGNIFICANT CHANGE UP (ref 9.5–13)
RBC # BLD: 3.81 M/UL — SIGNIFICANT CHANGE UP (ref 3.8–5.2)
RBC # BLD: 3.81 M/UL — SIGNIFICANT CHANGE UP (ref 3.8–5.2)
RBC # BLD: 3.92 M/UL — SIGNIFICANT CHANGE UP (ref 3.8–5.2)
RBC # BLD: 3.92 M/UL — SIGNIFICANT CHANGE UP (ref 3.8–5.2)
RBC # BLD: 4.4 M/UL — SIGNIFICANT CHANGE UP (ref 3.8–5.2)
RBC # BLD: 4.4 M/UL — SIGNIFICANT CHANGE UP (ref 3.8–5.2)
RBC # FLD: 13.7 % — SIGNIFICANT CHANGE UP (ref 10.3–14.5)
RBC # FLD: 13.7 % — SIGNIFICANT CHANGE UP (ref 10.3–14.5)
RBC # FLD: 13.9 % — SIGNIFICANT CHANGE UP (ref 10.3–14.5)
RBC # FLD: 13.9 % — SIGNIFICANT CHANGE UP (ref 10.3–14.5)
RBC # FLD: 14.2 % — SIGNIFICANT CHANGE UP (ref 10.3–14.5)
RBC # FLD: 14.2 % — SIGNIFICANT CHANGE UP (ref 10.3–14.5)
SODIUM SERPL-SCNC: 129 MMOL/L — LOW (ref 135–145)
SODIUM SERPL-SCNC: 129 MMOL/L — LOW (ref 135–145)
SODIUM SERPL-SCNC: 130 MMOL/L — LOW (ref 135–145)
SODIUM SERPL-SCNC: 130 MMOL/L — LOW (ref 135–145)
SODIUM SERPL-SCNC: 132 MMOL/L — LOW (ref 135–145)
SODIUM SERPL-SCNC: 132 MMOL/L — LOW (ref 135–145)
THC UR QL: NEGATIVE — SIGNIFICANT CHANGE UP
THC UR QL: NEGATIVE — SIGNIFICANT CHANGE UP
URATE SERPL-MCNC: 5.4 MG/DL — SIGNIFICANT CHANGE UP (ref 2.5–7)
URATE SERPL-MCNC: 5.4 MG/DL — SIGNIFICANT CHANGE UP (ref 2.5–7)
URATE SERPL-MCNC: 5.6 MG/DL — SIGNIFICANT CHANGE UP (ref 2.5–7)
URATE SERPL-MCNC: 5.6 MG/DL — SIGNIFICANT CHANGE UP (ref 2.5–7)
URATE SERPL-MCNC: 5.7 MG/DL — SIGNIFICANT CHANGE UP (ref 2.5–7)
URATE SERPL-MCNC: 5.7 MG/DL — SIGNIFICANT CHANGE UP (ref 2.5–7)
WBC # BLD: 11.75 K/UL — HIGH (ref 3.8–10.5)
WBC # BLD: 11.75 K/UL — HIGH (ref 3.8–10.5)
WBC # BLD: 15.85 K/UL — HIGH (ref 3.8–10.5)
WBC # BLD: 15.85 K/UL — HIGH (ref 3.8–10.5)
WBC # BLD: 21.53 K/UL — HIGH (ref 3.8–10.5)
WBC # BLD: 21.53 K/UL — HIGH (ref 3.8–10.5)
WBC # FLD AUTO: 11.75 K/UL — HIGH (ref 3.8–10.5)
WBC # FLD AUTO: 11.75 K/UL — HIGH (ref 3.8–10.5)
WBC # FLD AUTO: 15.85 K/UL — HIGH (ref 3.8–10.5)
WBC # FLD AUTO: 15.85 K/UL — HIGH (ref 3.8–10.5)
WBC # FLD AUTO: 21.53 K/UL — HIGH (ref 3.8–10.5)
WBC # FLD AUTO: 21.53 K/UL — HIGH (ref 3.8–10.5)

## 2023-11-28 PROCEDURE — 88307 TISSUE EXAM BY PATHOLOGIST: CPT | Mod: 26

## 2023-11-28 RX ORDER — BENZOCAINE 10 %
1 GEL (GRAM) MUCOUS MEMBRANE EVERY 6 HOURS
Refills: 0 | Status: DISCONTINUED | OUTPATIENT
Start: 2023-11-28 | End: 2023-11-30

## 2023-11-28 RX ORDER — SODIUM CHLORIDE 9 MG/ML
1000 INJECTION INTRAMUSCULAR; INTRAVENOUS; SUBCUTANEOUS
Refills: 0 | Status: DISCONTINUED | OUTPATIENT
Start: 2023-11-28 | End: 2023-11-28

## 2023-11-28 RX ORDER — ACETAMINOPHEN 500 MG
975 TABLET ORAL
Refills: 0 | Status: DISCONTINUED | OUTPATIENT
Start: 2023-11-28 | End: 2023-11-30

## 2023-11-28 RX ORDER — DIPHENHYDRAMINE HCL 50 MG
25 CAPSULE ORAL EVERY 6 HOURS
Refills: 0 | Status: DISCONTINUED | OUTPATIENT
Start: 2023-11-28 | End: 2023-11-30

## 2023-11-28 RX ORDER — MAGNESIUM SULFATE 500 MG/ML
1 VIAL (ML) INJECTION
Qty: 40 | Refills: 0 | Status: DISCONTINUED | OUTPATIENT
Start: 2023-11-28 | End: 2023-11-28

## 2023-11-28 RX ORDER — MAGNESIUM HYDROXIDE 400 MG/1
30 TABLET, CHEWABLE ORAL
Refills: 0 | Status: DISCONTINUED | OUTPATIENT
Start: 2023-11-28 | End: 2023-11-30

## 2023-11-28 RX ORDER — IBUPROFEN 200 MG
600 TABLET ORAL EVERY 6 HOURS
Refills: 0 | Status: DISCONTINUED | OUTPATIENT
Start: 2023-11-28 | End: 2023-11-30

## 2023-11-28 RX ORDER — SODIUM CHLORIDE 9 MG/ML
1000 INJECTION INTRAMUSCULAR; INTRAVENOUS; SUBCUTANEOUS
Refills: 0 | Status: DISCONTINUED | OUTPATIENT
Start: 2023-11-28 | End: 2023-11-30

## 2023-11-28 RX ORDER — TETANUS TOXOID, REDUCED DIPHTHERIA TOXOID AND ACELLULAR PERTUSSIS VACCINE, ADSORBED 5; 2.5; 8; 8; 2.5 [IU]/.5ML; [IU]/.5ML; UG/.5ML; UG/.5ML; UG/.5ML
0.5 SUSPENSION INTRAMUSCULAR ONCE
Refills: 0 | Status: DISCONTINUED | OUTPATIENT
Start: 2023-11-28 | End: 2023-11-30

## 2023-11-28 RX ORDER — HYDROCORTISONE 1 %
1 OINTMENT (GRAM) TOPICAL EVERY 6 HOURS
Refills: 0 | Status: DISCONTINUED | OUTPATIENT
Start: 2023-11-28 | End: 2023-11-30

## 2023-11-28 RX ORDER — OXYTOCIN 10 UNIT/ML
41.67 VIAL (ML) INJECTION
Qty: 20 | Refills: 0 | Status: DISCONTINUED | OUTPATIENT
Start: 2023-11-28 | End: 2023-11-29

## 2023-11-28 RX ORDER — OXYCODONE HYDROCHLORIDE 5 MG/1
5 TABLET ORAL ONCE
Refills: 0 | Status: DISCONTINUED | OUTPATIENT
Start: 2023-11-28 | End: 2023-11-30

## 2023-11-28 RX ORDER — KETOROLAC TROMETHAMINE 30 MG/ML
30 SYRINGE (ML) INJECTION ONCE
Refills: 0 | Status: DISCONTINUED | OUTPATIENT
Start: 2023-11-28 | End: 2023-11-28

## 2023-11-28 RX ORDER — PRAMOXINE HYDROCHLORIDE 150 MG/15G
1 AEROSOL, FOAM RECTAL EVERY 4 HOURS
Refills: 0 | Status: DISCONTINUED | OUTPATIENT
Start: 2023-11-28 | End: 2023-11-30

## 2023-11-28 RX ORDER — OXYCODONE HYDROCHLORIDE 5 MG/1
5 TABLET ORAL
Refills: 0 | Status: DISCONTINUED | OUTPATIENT
Start: 2023-11-28 | End: 2023-11-30

## 2023-11-28 RX ORDER — DIBUCAINE 1 %
1 OINTMENT (GRAM) RECTAL EVERY 6 HOURS
Refills: 0 | Status: DISCONTINUED | OUTPATIENT
Start: 2023-11-28 | End: 2023-11-30

## 2023-11-28 RX ORDER — AER TRAVELER 0.5 G/1
1 SOLUTION RECTAL; TOPICAL EVERY 4 HOURS
Refills: 0 | Status: DISCONTINUED | OUTPATIENT
Start: 2023-11-28 | End: 2023-11-30

## 2023-11-28 RX ORDER — LANOLIN
1 OINTMENT (GRAM) TOPICAL EVERY 6 HOURS
Refills: 0 | Status: DISCONTINUED | OUTPATIENT
Start: 2023-11-28 | End: 2023-11-30

## 2023-11-28 RX ORDER — NIFEDIPINE 30 MG
30 TABLET, EXTENDED RELEASE 24 HR ORAL DAILY
Refills: 0 | Status: DISCONTINUED | OUTPATIENT
Start: 2023-11-28 | End: 2023-11-28

## 2023-11-28 RX ORDER — SIMETHICONE 80 MG/1
80 TABLET, CHEWABLE ORAL EVERY 4 HOURS
Refills: 0 | Status: DISCONTINUED | OUTPATIENT
Start: 2023-11-28 | End: 2023-11-30

## 2023-11-28 RX ORDER — SODIUM CHLORIDE 9 MG/ML
3 INJECTION INTRAMUSCULAR; INTRAVENOUS; SUBCUTANEOUS EVERY 8 HOURS
Refills: 0 | Status: DISCONTINUED | OUTPATIENT
Start: 2023-11-28 | End: 2023-11-30

## 2023-11-28 RX ORDER — ACETAMINOPHEN 500 MG
1000 TABLET ORAL ONCE
Refills: 0 | Status: COMPLETED | OUTPATIENT
Start: 2023-11-28 | End: 2023-11-28

## 2023-11-28 RX ORDER — NIFEDIPINE 30 MG
30 TABLET, EXTENDED RELEASE 24 HR ORAL DAILY
Refills: 0 | Status: DISCONTINUED | OUTPATIENT
Start: 2023-11-28 | End: 2023-11-30

## 2023-11-28 RX ORDER — IBUPROFEN 200 MG
600 TABLET ORAL EVERY 6 HOURS
Refills: 0 | Status: COMPLETED | OUTPATIENT
Start: 2023-11-28 | End: 2024-10-26

## 2023-11-28 RX ADMIN — SODIUM CHLORIDE 75 MILLILITER(S): 9 INJECTION INTRAMUSCULAR; INTRAVENOUS; SUBCUTANEOUS at 16:03

## 2023-11-28 RX ADMIN — Medication 30 MILLIGRAM(S): at 15:02

## 2023-11-28 RX ADMIN — CHLORHEXIDINE GLUCONATE 1 APPLICATION(S): 213 SOLUTION TOPICAL at 06:00

## 2023-11-28 RX ADMIN — SODIUM CHLORIDE 3 MILLILITER(S): 9 INJECTION INTRAMUSCULAR; INTRAVENOUS; SUBCUTANEOUS at 22:00

## 2023-11-28 RX ADMIN — Medication 25 GM/HR: at 19:07

## 2023-11-28 RX ADMIN — Medication 975 MILLIGRAM(S): at 21:00

## 2023-11-28 RX ADMIN — Medication 30 MILLIGRAM(S): at 13:10

## 2023-11-28 RX ADMIN — Medication 975 MILLIGRAM(S): at 20:33

## 2023-11-28 RX ADMIN — Medication 30 MILLIGRAM(S): at 09:10

## 2023-11-28 RX ADMIN — Medication 1000 MILLIUNIT(S)/MIN: at 13:09

## 2023-11-28 RX ADMIN — Medication 25 GM/HR: at 17:42

## 2023-11-28 RX ADMIN — Medication 25 GM/HR: at 15:37

## 2023-11-28 NOTE — OB PROVIDER DELIVERY SUMMARY - NSSELHIDDEN_OBGYN_ALL_OB_FT
[NS_DeliveryAttending1_OBGYN_ALL_OB_FT:[id number]],[NS_DeliveryAssist1_OBGYN_ALL_OB_FT:[id number]]

## 2023-11-28 NOTE — OB RN DELIVERY SUMMARY - NS_SEPSISRSKCALC_OBGYN_ALL_OB_FT
EOS calculated successfully. EOS Risk Factor: 0.5/1000 live births ([facility] national incidence); GA=39w5d; Temp=99.14; ROM=3.55; GBS='Unknown'; Antibiotics='No antibiotics or any antibiotics < 2 hrs prior to birth'

## 2023-11-28 NOTE — PROVIDER CONTACT NOTE (CRITICAL VALUE NOTIFICATION) - SITUATION
MAY Cunningham aware of results as she reviewed the lab results in the chart.  Patient is pre-eclamptic being treated with Magnesium Sulfate 2grams/hour IV

## 2023-11-28 NOTE — OB PROVIDER DELIVERY SUMMARY - NSPROVIDERDELIVERYNOTE_OBGYN_ALL_OB_FT
A live female infant was born in WAN position via . The head and body were delivered atraumatically and placed on mother's abdomen. The mouth and nose were suctioned using a bulb. Delayed cord clamping was performed. The infant was then handed off to waiting Neonatologist. The placenta was delivered spontaneously.  The uterus was massaged and noted to be firm. First degree bilateral labial laceration repaired using 2-0 chromic suture. Second degree perineal laceration was repaired using 2-0 chromic suture. There were no complications. Sponge, needle and instrument count correct x 2. Infants Apgar 8-9. MBeauvil A live female infant was born in WAN position via . The head and body were delivered atraumatically and placed on mother's abdomen. The mouth and nose were suctioned using a bulb. Delayed cord clamping was performed. The infant was then handed off to waiting Neonatologist. The placenta was delivered spontaneously.  The uterus was massaged and noted to be firm. First degree bilateral labial laceration repaired using 2-0 chromic suture. Second degree perineal laceration was repaired using 2-0 chromic suture. There were no complications. Sponge, needle and instrument count correct x 2. Infants Apgar 8-9, 2850g. MBeauvil

## 2023-11-28 NOTE — OB NEONATOLOGY/PEDIATRICIAN DELIVERY SUMMARY - NSPEDSNEONOTESA_OBGYN_ALL_OB_FT
Pediatrician called to delivery for cat II tracings. Female AGA infant born at 39.5 wks via  to a 26 y/o  blood type A+ mother. Prenatal history of SPEC on Mag. Prenatal labs nr/immune/-, GBS - on . SROM at 08:00 on  with clear fluids. EOS score 0.34, highest maternal temperature 37.3. Terminal meconium. Baby emerged vigorous, crying. Cord clamping delayed 60sec. Infant was brought to radiant warmer and warmed, dried, stimulated and suctioned. HR>100, normal respiratory effort. APGARS of 8/9. Mom is initiating breast feeding. Consents to Hepatitis B vaccination.     BW:   : 2021  TOB: 11:33    Physical Exam:  Gen: no acute distress, +grimace  HEENT:  anterior fontanel open soft and flat, nondysmorphic facies, no cleft lip/palate, ears normal set, no ear pits or tags, nares clinically patent  Resp: Normal respiratory effort without grunting or retractions, good air entry b/l, clear to auscultation bilaterally  Cardio: Present S1/S2, regular rate and rhythm, no murmurs  Abd: soft, non tender, non distended, umbilical cord with 3 vessels  Neuro: +palmar and plantar grasp, +suck, +domo, normal tone  Extremities: negative matthew and ortolani maneuvers, moving all extremities, no clavicular crepitus or stepoff  Skin: pink, warm  Genitals: Normal female anatomy, Sin 1, anus patent

## 2023-11-28 NOTE — OB PROVIDER LABOR PROGRESS NOTE - NS_SUBJECTIVE/OBJECTIVE_OBGYN_ALL_OB_FT
Patient examined due to prolonged deceleration. Epidural in place and effective. Pitocin infusing @ 22mu/min.    Vital Signs Last 24 Hrs  T(C): 36.9 (27 Nov 2023 16:00), Max: 36.9 (27 Nov 2023 05:03)  T(F): 98.42 (27 Nov 2023 16:00), Max: 98.42 (27 Nov 2023 05:15)  HR: 80 (27 Nov 2023 17:18) (60 - 104)  BP: 129/82 (27 Nov 2023 17:18) (80/47 - 185/104)  RR: 16 (27 Nov 2023 16:00) (16 - 20)  SpO2: 100% (27 Nov 2023 17:17) (92% - 100%)
Patient seen and examined to assess for cervical change. Patient states intermittent pressure with contractions. Effective epidural in place.   VS  T(C): 36.4 (11-27-23 @ 22:35)  HR: 85 (11-27-23 @ 23:02)  BP: 128/83 (11-27-23 @ 23:01)  RR: 16 (11-27-23 @ 22:35)  SpO2: 98% (11-27-23 @ 23:02)
R3 OB Labor Note    S: Patient seen and examined at bedside.     T(C): 36.8 (11-28-23 @ 02:28), Max: 36.9 (11-27-23 @ 16:00)  HR: 78 (11-28-23 @ 05:17) (60 - 114)  BP: 126/81 (11-28-23 @ 05:16) (80/47 - 185/104)  BP(mean): --  ABP: --  ABP(mean): --  RR: 18 (11-28-23 @ 02:28) (16 - 20)  SpO2: 98% (11-28-23 @ 05:17) (90% - 100%)  Wt(kg): --  CVP(mm Hg): --  CI: --  CAPILLARY BLOOD GLUCOSE       N/A      11-26 @ 07:01  -  11-27 @ 07:00  --------------------------------------------------------  IN:    Lactated Ringers: 250 mL  Total IN: 250 mL    OUT:  Total OUT: 0 mL    Total NET: 250 mL      11-27 @ 07:01  -  11-28 @ 05:21  --------------------------------------------------------  IN:    Lactated Ringers: 1075 mL    Magnesium Sulfate: 950 mL  Total IN: 2025 mL    OUT:    Indwelling Catheter - Urethral (mL): 698 mL    Voided (mL): 150 mL  Total OUT: 848 mL    Total NET: 1177 mL
Labor & Delivery Progress Note     Pt examined @ 0755 to evaluate for cervical change and possible SROM ~0755    T(C): 36.8 (11-27-23 @ 06:54), Max: 36.9 (11-27-23 @ 05:03)  HR: 72 (11-27-23 @ 08:35) (60 - 73)  BP: 165/94 (11-27-23 @ 08:35) (129/70 - 185/104)  RR: 20 (11-27-23 @ 06:54) (16 - 20)  SpO2: 97% (11-27-23 @ 08:32) (97% - 97%)

## 2023-11-28 NOTE — OB PROVIDER LABOR PROGRESS NOTE - NS_OBIHIFHRDETAILS_OBGYN_ALL_OB_FT
120 mod makenna/+accels/+ intermittent late decelerations
130 bpm, minimal variability, +accels, late and variable decels, prolonged decel x8 min
115/mod/+acels
125/mod/+accels/+recurrent variables
140/mod/(+)accels/(+)intermittent variables

## 2023-11-28 NOTE — OB RN DELIVERY SUMMARY - NSSELHIDDEN_OBGYN_ALL_OB_FT
[NS_DeliveryAttending1_OBGYN_ALL_OB_FT:[id number]],[NS_DeliveryAssist1_OBGYN_ALL_OB_FT:[id number]],[NS_DeliveryRN_OBGYN_ALL_OB_FT:[id number]]

## 2023-11-28 NOTE — PROVIDER CONTACT NOTE (CRITICAL VALUE NOTIFICATION) - ASSESSMENT
Patient sleepy, arousable by calling name and tactile stimulation.  EFM tracing with 115bpm, minimal variability

## 2023-11-29 ENCOUNTER — TRANSCRIPTION ENCOUNTER (OUTPATIENT)
Age: 28
End: 2023-11-29

## 2023-11-29 LAB
HCT VFR BLD CALC: 23 % — LOW (ref 34.5–45)
HCT VFR BLD CALC: 23 % — LOW (ref 34.5–45)
HGB BLD-MCNC: 7.9 G/DL — LOW (ref 11.5–15.5)
HGB BLD-MCNC: 7.9 G/DL — LOW (ref 11.5–15.5)
MAGNESIUM SERPL-MCNC: 4.1 MG/DL — HIGH (ref 1.6–2.6)
MAGNESIUM SERPL-MCNC: 4.1 MG/DL — HIGH (ref 1.6–2.6)
MAGNESIUM SERPL-MCNC: 4.4 MG/DL — HIGH (ref 1.6–2.6)
MAGNESIUM SERPL-MCNC: 4.4 MG/DL — HIGH (ref 1.6–2.6)
MCHC RBC-ENTMCNC: 29.6 PG — SIGNIFICANT CHANGE UP (ref 27–34)
MCHC RBC-ENTMCNC: 29.6 PG — SIGNIFICANT CHANGE UP (ref 27–34)
MCHC RBC-ENTMCNC: 34.3 GM/DL — SIGNIFICANT CHANGE UP (ref 32–36)
MCHC RBC-ENTMCNC: 34.3 GM/DL — SIGNIFICANT CHANGE UP (ref 32–36)
MCV RBC AUTO: 86.1 FL — SIGNIFICANT CHANGE UP (ref 80–100)
MCV RBC AUTO: 86.1 FL — SIGNIFICANT CHANGE UP (ref 80–100)
NRBC # BLD: 0 /100 WBCS — SIGNIFICANT CHANGE UP (ref 0–0)
NRBC # BLD: 0 /100 WBCS — SIGNIFICANT CHANGE UP (ref 0–0)
NRBC # FLD: 0 K/UL — SIGNIFICANT CHANGE UP (ref 0–0)
NRBC # FLD: 0 K/UL — SIGNIFICANT CHANGE UP (ref 0–0)
PLATELET # BLD AUTO: 166 K/UL — SIGNIFICANT CHANGE UP (ref 150–400)
PLATELET # BLD AUTO: 166 K/UL — SIGNIFICANT CHANGE UP (ref 150–400)
RBC # BLD: 2.67 M/UL — LOW (ref 3.8–5.2)
RBC # BLD: 2.67 M/UL — LOW (ref 3.8–5.2)
RBC # FLD: 14.4 % — SIGNIFICANT CHANGE UP (ref 10.3–14.5)
RBC # FLD: 14.4 % — SIGNIFICANT CHANGE UP (ref 10.3–14.5)
WBC # BLD: 12.16 K/UL — HIGH (ref 3.8–10.5)
WBC # BLD: 12.16 K/UL — HIGH (ref 3.8–10.5)
WBC # FLD AUTO: 12.16 K/UL — HIGH (ref 3.8–10.5)
WBC # FLD AUTO: 12.16 K/UL — HIGH (ref 3.8–10.5)

## 2023-11-29 RX ORDER — BENZOYL PEROXIDE MICRONIZED 5.8 %
1 TOWELETTE (EA) TOPICAL
Refills: 0 | DISCHARGE

## 2023-11-29 RX ORDER — MAGNESIUM SULFATE 500 MG/ML
1 VIAL (ML) INJECTION
Qty: 40 | Refills: 0 | Status: DISCONTINUED | OUTPATIENT
Start: 2023-11-29 | End: 2023-11-29

## 2023-11-29 RX ORDER — IBUPROFEN 200 MG
1 TABLET ORAL
Qty: 20 | Refills: 0
Start: 2023-11-29 | End: 2023-12-03

## 2023-11-29 RX ORDER — ASCORBIC ACID 60 MG
0 TABLET,CHEWABLE ORAL
Refills: 0 | DISCHARGE

## 2023-11-29 RX ORDER — NIFEDIPINE 30 MG
1 TABLET, EXTENDED RELEASE 24 HR ORAL
Qty: 60 | Refills: 0
Start: 2023-11-29 | End: 2024-01-27

## 2023-11-29 RX ADMIN — Medication 975 MILLIGRAM(S): at 09:42

## 2023-11-29 RX ADMIN — Medication 975 MILLIGRAM(S): at 10:40

## 2023-11-29 RX ADMIN — Medication 600 MILLIGRAM(S): at 06:33

## 2023-11-29 RX ADMIN — Medication 25 GM/HR: at 08:03

## 2023-11-29 RX ADMIN — Medication 975 MILLIGRAM(S): at 21:58

## 2023-11-29 RX ADMIN — SODIUM CHLORIDE 3 MILLILITER(S): 9 INJECTION INTRAMUSCULAR; INTRAVENOUS; SUBCUTANEOUS at 22:00

## 2023-11-29 RX ADMIN — Medication 975 MILLIGRAM(S): at 22:30

## 2023-11-29 RX ADMIN — Medication 600 MILLIGRAM(S): at 07:30

## 2023-11-29 RX ADMIN — Medication 30 MILLIGRAM(S): at 06:33

## 2023-11-29 RX ADMIN — Medication 1 TABLET(S): at 12:17

## 2023-11-29 RX ADMIN — SODIUM CHLORIDE 3 MILLILITER(S): 9 INJECTION INTRAMUSCULAR; INTRAVENOUS; SUBCUTANEOUS at 05:59

## 2023-11-29 NOTE — DISCHARGE NOTE OB - MEDICATION SUMMARY - MEDICATIONS TO STOP TAKING
I will STOP taking the medications listed below when I get home from the hospital:  None I will STOP taking the medications listed below when I get home from the hospital:    Iron 100 Plus oral tablet  -- 1 tab(s) orally

## 2023-11-29 NOTE — DISCHARGE NOTE OB - CARE PLAN
Principal Discharge DX:	Term birth of female   Assessment and plan of treatment:	Pelvic rest x 6wks  Regular diet  Secondary Diagnosis:	Preeclampsia  Assessment and plan of treatment:	Normal blood pressures  Continue Procardia 30mg XL   1 Principal Discharge DX:	Term birth of female   Assessment and plan of treatment:	Pelvic rest x 6wks  Regular diet  Secondary Diagnosis:	Preeclampsia  Assessment and plan of treatment:	Normal blood pressures  Continue Procardia 30mg XL  Secondary Diagnosis:	Postpartum anemia

## 2023-11-29 NOTE — DISCHARGE NOTE OB - MATERIALS PROVIDED
Vaccinations/[facility]  Screening Program/  Immunization Record/Breastfeeding Log/Breastfeeding Mother’s Support Group Information/Guide to Postpartum Care/[facility] Hearing Screen Program/Back To Sleep Handout/Shaken Baby Prevention Handout/Birth Certificate Instructions/Discharge Medication Information for Patients and Families Pocket Guide/Tdap Vaccination (VIS Pub Date: 2012)

## 2023-11-29 NOTE — DISCHARGE NOTE OB - NS MD DC FALL RISK RISK
For information on Fall & Injury Prevention, visit: [email or web address] OR  [email or web address] OR  https://www.cdc.gov/steadi/patient.html

## 2023-11-29 NOTE — CHART NOTE - NSCHARTNOTEFT_GEN_A_CORE
Hypertensive Note  27 y.o.  at 39/4 weeks who has elevated BP's which range from 129//104.   with 2 severe BPs within an hour she met criteria for sPEC and will be started on magnesium.  per EVERTON Sewell PGY-4 Procardia 10 to be given 2/2 severe BPs x4 within an hour   Denies HA, blurry vision, RUQ pain.     O:   Vitals:  T(C): 36.8 (23 @ 06:54), Max: 36.9 (23 @ 05:03)  HR: 68 (23 @ 08:32) (60 - 73)  BP: 168/94 (23 @ 08:20) (129/70 - 185/104)  RR: 20 (23 @ 06:54) (16 - 20)  SpO2: 97% (23 @ 08:32) (97% - 97%)        Labs:                        12.0   8.28  )-----------( 210      ( 2023 06:15 )             34.4         137    |  103    |  9      ----------------------------<  75     4.0     |  20<L>  |  0.58     Ca    9.0        2023 06:15    TPro  6.8    /  Alb  3.6    /  TBili  <0.2   /  DBili  x      /  AST  17     /  ALT  17     /  AlkPhos  104            PT/INR - ( 2023 06:15 )   PT: 10.0 sec;   INR: <0.90 ratio         PTT - ( 2023 06:15 )  PTT:27.8 sec    Urine P:Cr: ***        I/O:    23 @ 07:01  -  23 @ 07:00  --------------------------------------------------------  IN: 250 mL / OUT: 0 mL / NET: 250 mL          A/P:  27 y.o.  at 39/4 weeks admitted in early labor, now meeting criteria for sPEC     -Initiate Magnesium loading dose 4g   -Magnesium maintenance dose 2g  - Magnesium level q 6 hours  - procardia 10 IR  -Calcium gluconate 1g IV to be given over 10 min for  Magnesium toxicity   - Antihypertensive IV med if SBP>/= 160 or DBP >/=110  -UOP  to be followed closely  - Monitor BP's closely  - pt does not desire epidural at this time    d/w EVERTON Sewell PGY-4    kunal ARROYO
On call Attending Note    28 yo  with IUP 39 5/7wks ga admitted yesterday c/o painful contractions. Patient was diagnosed with severe preeclampsia on admission. Patient has been on pitocin for induction of labor, has had Amnioinfusion for category II overnight. category I FHR now. Received report of patient 4cm all night and the need to reassess regarding delivery plan.  Patient evaluated at bedside. Report mild headache and rectal pressure. Denies visual changes, epigastric pain, shortness of breath, etc.   VS T 36.9  P 82  R 18  /93      Heent nl  abd gravid, soft toco ctx q2-4min pitocin at 22mu/min  EFW 8.5by leopolds   reactive category I  VE 7/90/0    A: 28 yo P0 with IUP 39 5/7wks ga severe preeclampsia  P: Epidural topoff     Continue magnesium sulfate     Decrease pitocin to 16mu/min     Anticipate      MBeauvil
0736    At bedside to discuss plan of care/obhx    Says she follows w/ Dr. Soraya Alarcon associated with [facility] and denies any issues with the pregnancy or hx of HTN disorder of pregnancy. Reviewed that patient has been having elevated BPs and there is a concern for an underlying htn disorder of pregnancy. Denies headaches, scotomas, chest pain, shortness of breath, n/v, or RUQ pain.     Bedside RN said that severe range BP from earlier in the hour was due to faulty positioning of the cuff. Awaiting 15min repeat    Lenin Hinton  PGY-2, Obstetrics & Gynecology
Attending Note    Was notified by Resident regarding elevated Magnesium level of 9.4. Magnesium paused. Patient evaluated at bedside. Patient reports feeling sleepy and some pain with contractions. Otherwise patient is alert, oriented x 3.   VS T 37.3  P 81  R 16  Bp 134/74  O2 sat 99%    Heent nl  abd soft toco ctx q 2-3min   reactive   VE FD/100/+1  labs: H/H 13/38.9  plts 226K  Magnesium level 9.4  AST 22  ALT 15  A: 26 yo with IUP 39 5/7wks ga severe PEC; Magnesium paused  P: Anticipate      MBeauvil
Attending note    Patient evaluated at bedside. Denies any complaints or acute events overnight. Denies headache, visual changes, epigastric pain, etc. Lochia light. No dysuria. Minimal perineal discomfort. Breast feeding.  VS T 97.9  P 100 R 17  Bp 125/83   Range 124-142/79-90    Heent nl  Abd soft fundus firm  Ext no CCE  neuro AAo x 3  labs WBC 21 11/28/23  H/H 11.3/32.4    A: 28 yo PPD#1 s/p , Severe preeclampsia, stable clinically   P: Discontinue Magnesium sulfate     continue Procardia 30mg XL     F/U repeat CBC to check WBC     Routine postpartum care     MBeauvil
PTA called for this 28 y/o  admitted at 39/4 weeks in latent phase labor and subsequently met criteria for sPEC.  Pt. undergoing induction of labor and is receiving magnesium.    FHR baseline 125 with moderate variability and occasional late FHR decelerations.    ICU Vital Signs Last 24 Hrs  T(C): 36.4 (2023 22:35), Max: 36.9 (2023 05:03)  T(F): 97.52 (2023 22:35), Max: 98.42 (2023 05:15)  HR: 98 (2023 22:52) (60 - 114)  BP: 127/85 (2023 22:47) (80/47 - 185/104)  RR: 16 (2023 22:35) (16 - 20)  SpO2: 97% (2023 22:52) (90% - 100%)    O2 Parameters below as of 2023 06:54  Patient On (Oxygen Delivery Method): room air    A/P: Continue induction of labor and monitor FHT.  Martin Queen M.D.
Pt seen at bedside after RN alerted me that pt is lethargic.     Pt reports feeling tired. Reports +HA- sharp, frontal 8/10. Denies blurry or double vision, CP, SOB or RUQ pain.     Vital Signs Last 24 Hrs  T(C): 36.9 (2023 05:26), Max: 36.9 (2023 16:00)  T(F): 98.42 (2023 05:26), Max: 98.42 (2023 16:00)  HR: 82 (2023 08:57) (64 - 114)  BP: 133/70 (2023 08:46) (80/47 - 153/93)  BP(mean): --  RR: 18 (2023 05:26) (16 - 18)  SpO2: 100% (2023 08:57) (76% - 100%)    Gen: responsive to verbal stimuli, A&Ox4, lethergic  Heart: no compliants  lungs: no complaints  reflexes 2+ bilaterally    Last Mg level @2:30am=6.5    , minimal variability, -accels, -decels, cat 2  IUPC: q2-4min  VE last by Dr Colvin @8am 7cm dilated    28y/o  at 39+5wks sPEC IOL on MgSO4 with low suspicion for MgSO4 toxicity.     Plan  -IV tylenol- then reassess HA  -STAT Mg level  -continue to monitor for s/s of MgSO4 toxicity or sPEC  -continue EFM/IUPC/AI/IV fluids/MgSO4    Message sent to Dr Vinicius Cunningham [facility]

## 2023-11-29 NOTE — PROVIDER CONTACT NOTE (OTHER) - ASSESSMENT
Pt vital signs @ 0415 /90, , Temp 98.1, O2 on RA 98, RR 18. Pt is non symptomatic. Pt denies headache , vision changes, dizziness.

## 2023-11-29 NOTE — DISCHARGE NOTE OB - CARE PROVIDER_API CALL
Will Alarcon  Obstetrics and Gynecology  [address] [place] [id number]  Phone: [phone]  Fax: [phone]  Follow Up Time:

## 2023-11-29 NOTE — PROVIDER CONTACT NOTE (OTHER) - BACKGROUND
NSD from 11/28 @1133. Pt diagnosed with severe pre eclampsia, and is on magnesium sulfate 1G. Pt is on Q2 vitals.

## 2023-11-29 NOTE — DISCHARGE NOTE OB - MEDICATION SUMMARY - MEDICATIONS TO TAKE
I will START or STAY ON the medications listed below when I get home from the hospital:    ibuprofen 600 mg oral tablet  -- 1 tab(s) by mouth 4 times a day  -- Indication: For Encounter for full-term uncomplicated delivery    Procardia XL 30 mg oral tablet, extended release  -- 1 tab(s) by mouth once a day  -- Indication: For Preeclampsia   I will START or STAY ON the medications listed below when I get home from the hospital:    ibuprofen 600 mg oral tablet  -- 1 tab(s) by mouth 4 times a day  -- Indication: For Encounter for full-term uncomplicated delivery    Procardia XL 30 mg oral tablet, extended release  -- 1 tab(s) by mouth once a day  -- Indication: For Preeclampsia    ferrous sulfate 325 mg (65 mg elemental iron) oral delayed release tablet  -- 1 tab(s) by mouth 3 times a day  -- Indication: For Postpartum anemia

## 2023-11-29 NOTE — DISCHARGE NOTE OB - ADDITIONAL INSTRUCTIONS
1wk for Blood pressure check     PRESCRIPTIONS FROM [facility] PRIOR TO DISCHARGE

## 2023-11-29 NOTE — DISCHARGE NOTE OB - PATIENT PORTAL LINK FT
You can access the FollowMyHealth Patient Portal offered by [facility] by registering at the following website: [email or web address]. By joining [employer]’s FollowMyHealth portal, you will also be able to view your health information using other applications (apps) compatible with our system.

## 2023-11-29 NOTE — DISCHARGE NOTE OB - HOSPITAL COURSE
Patient presented to labor and delivery c/o contractions. Patient was noted to have elevated blood pressures. Patient was treated with Magnesium and Procardia.  Severe preeclampsia was diagnosed. After 24 hr induction of labor a live female infant was born via . The patient had uneventful hospital course and discharged in stable condition on Procardia 30mg XL QD.  Patient presented to labor and delivery c/o contractions. Patient was noted to have elevated blood pressures. Patient was treated with Magnesium and Procardia.  Severe preeclampsia was diagnosed. After 24 hr induction of labor a live female infant was born via . The patient had uneventful hospital course and discharged in stable condition on Procardia 30mg XL QD and iron tid for postpartum anemia of acute blood loss.. Follow up for blood pressure check in 7-10 days with provider.

## 2023-11-30 VITALS
SYSTOLIC BLOOD PRESSURE: 129 MMHG | DIASTOLIC BLOOD PRESSURE: 73 MMHG | TEMPERATURE: 98 F | RESPIRATION RATE: 17 BRPM | OXYGEN SATURATION: 100 % | HEART RATE: 89 BPM

## 2023-11-30 LAB
HCT VFR BLD CALC: 24 % — LOW (ref 34.5–45)
HCT VFR BLD CALC: 24 % — LOW (ref 34.5–45)
HGB BLD-MCNC: 8.1 G/DL — LOW (ref 11.5–15.5)
HGB BLD-MCNC: 8.1 G/DL — LOW (ref 11.5–15.5)
MCHC RBC-ENTMCNC: 29.9 PG — SIGNIFICANT CHANGE UP (ref 27–34)
MCHC RBC-ENTMCNC: 29.9 PG — SIGNIFICANT CHANGE UP (ref 27–34)
MCHC RBC-ENTMCNC: 33.8 GM/DL — SIGNIFICANT CHANGE UP (ref 32–36)
MCHC RBC-ENTMCNC: 33.8 GM/DL — SIGNIFICANT CHANGE UP (ref 32–36)
MCV RBC AUTO: 88.6 FL — SIGNIFICANT CHANGE UP (ref 80–100)
MCV RBC AUTO: 88.6 FL — SIGNIFICANT CHANGE UP (ref 80–100)
NRBC # BLD: 0 /100 WBCS — SIGNIFICANT CHANGE UP (ref 0–0)
NRBC # BLD: 0 /100 WBCS — SIGNIFICANT CHANGE UP (ref 0–0)
NRBC # FLD: 0 K/UL — SIGNIFICANT CHANGE UP (ref 0–0)
NRBC # FLD: 0 K/UL — SIGNIFICANT CHANGE UP (ref 0–0)
PLATELET # BLD AUTO: 174 K/UL — SIGNIFICANT CHANGE UP (ref 150–400)
PLATELET # BLD AUTO: 174 K/UL — SIGNIFICANT CHANGE UP (ref 150–400)
RBC # BLD: 2.71 M/UL — LOW (ref 3.8–5.2)
RBC # BLD: 2.71 M/UL — LOW (ref 3.8–5.2)
RBC # FLD: 14.5 % — SIGNIFICANT CHANGE UP (ref 10.3–14.5)
RBC # FLD: 14.5 % — SIGNIFICANT CHANGE UP (ref 10.3–14.5)
WBC # BLD: 9.15 K/UL — SIGNIFICANT CHANGE UP (ref 3.8–10.5)
WBC # BLD: 9.15 K/UL — SIGNIFICANT CHANGE UP (ref 3.8–10.5)
WBC # FLD AUTO: 9.15 K/UL — SIGNIFICANT CHANGE UP (ref 3.8–10.5)
WBC # FLD AUTO: 9.15 K/UL — SIGNIFICANT CHANGE UP (ref 3.8–10.5)

## 2023-11-30 RX ORDER — FERROUS SULFATE 325(65) MG
1 TABLET ORAL
Qty: 90 | Refills: 0
Start: 2023-11-30

## 2023-11-30 RX ADMIN — Medication 30 MILLIGRAM(S): at 06:05

## 2023-11-30 RX ADMIN — Medication 600 MILLIGRAM(S): at 16:05

## 2023-11-30 RX ADMIN — Medication 600 MILLIGRAM(S): at 16:45

## 2023-11-30 RX ADMIN — SODIUM CHLORIDE 3 MILLILITER(S): 9 INJECTION INTRAMUSCULAR; INTRAVENOUS; SUBCUTANEOUS at 06:49

## 2023-11-30 RX ADMIN — Medication 600 MILLIGRAM(S): at 06:35

## 2023-11-30 RX ADMIN — Medication 600 MILLIGRAM(S): at 06:05

## 2023-11-30 NOTE — PROGRESS NOTE ADULT - SUBJECTIVE AND OBJECTIVE BOX
-This patient has met criteria for :    - GHTN [   ]   PEC w/o severe features  [   ]   PEC with severe features/Mag  [ X  ]     CHTN  [   ]     A Script for an Electronic Blood Pressure Monitor was written and :    -Sent to [facility]  [ X  ]   Sent to the Patient's Pharmacy  [   ]   Given to the Patient  [   ]    -Instructions given on BP monitoring; TID; call MD office if greater than 140/90; report to triage is greater than 160/100    -Reviewed signs and symptoms related to preeclampsia with patient such as HA, Change in vision, N/V. RUQ pain or severe heartburn    -Keep log BP's to present to MD during appointment or triage     -All questions answered, patient verbalized understanding  of the information given.    -Continue routine Care. For follow up with OB early next week.    - For discharge home today.    
    PTA  PTA was done with the team due to  Indication: CAT II intermittent   moderate variability accels occas nonrepetitive late decels  toco irreg    Plan: reassuring variability  Maternal Resuscitation  Decreased Pitocin   Will reevaluate in  2  hours      Ayush LOPEZ
Attending note  Patient was seen and evauated at bedside.  S: 27y PPD#2  Patient doing well. Minimal to moderate lochia. Pain controlled. Voiding. Passing Flatus. Tolerating a regular diet.   denies chest pain, lightheadedness, sob, upper abdominal pain, breastfeeding, denies depressed mood.  states sometimes h/a, Tylenol helped  O: Vital Signs Last 24 Hrs  T(C): 36.8 (30 Nov 2023 10:00), Max: 37.6 (30 Nov 2023 06:05)  T(F): 98.3 (30 Nov 2023 10:00), Max: 99.6 (30 Nov 2023 06:05)  HR: 79 (30 Nov 2023 10:00) (78 - 98)  BP: 116/73 (30 Nov 2023 10:00) (111/67 - 131/84)  RR: 18 (30 Nov 2023 10:00) (18 - 18)  SpO2: 100% (30 Nov 2023 10:00) (99% - 100%)    Parameters below as of 30 Nov 2023 10:00  Patient On (Oxygen Delivery Method): room air        Gen: NAD A+Ox3  Abd: soft, NT, ND, fundus firm below umbilicus  Lochia: minimal to moderate  Ext: no tenderness b/l    Meds:  acetaminophen     Tablet .. 975 milliGRAM(s) Oral <User Schedule>  benzocaine 20%/menthol 0.5% Spray 1 Spray(s) Topical every 6 hours PRN  dibucaine 1% Ointment 1 Application(s) Topical every 6 hours PRN  diphenhydrAMINE 25 milliGRAM(s) Oral every 6 hours PRN  diphtheria/tetanus/pertussis (acellular) Vaccine (Adacel) 0.5 milliLiter(s) IntraMuscular once  hydrocortisone 1% Cream 1 Application(s) Topical every 6 hours PRN  ibuprofen  Tablet. 600 milliGRAM(s) Oral every 6 hours  influenza   Vaccine 0.5 milliLiter(s) IntraMuscular once  lanolin Ointment 1 Application(s) Topical every 6 hours PRN  magnesium hydroxide Suspension 30 milliLiter(s) Oral two times a day PRN  NIFEdipine XL 30 milliGRAM(s) Oral daily  oxyCODONE    IR 5 milliGRAM(s) Oral every 3 hours PRN  oxyCODONE    IR 5 milliGRAM(s) Oral once PRN  pramoxine 1%/zinc 5% Cream 1 Application(s) Topical every 4 hours PRN  prenatal multivitamin 1 Tablet(s) Oral daily  simethicone 80 milliGRAM(s) Chew every 4 hours PRN  sodium chloride 0.9% lock flush 3 milliLiter(s) IV Push every 8 hours  sodium chloride 0.9%. 1000 milliLiter(s) IV Continuous <Continuous>  witch hazel Pads 1 Application(s) Topical every 4 hours PRN    Labs:                        8.1    9.15  )-----------( 174      ( 30 Nov 2023 08:57 )             24.0       A: 27y POD# s/pSVD Doing well.  postpartum anemia iron deficiency: stable, asymptomatic, continue iron supplements at home  SPEC: HELLP labs nl, no symptoms, normotensive on Procardia XL 30. Plan: continue medication, check bp at home tid and with symptoms  Plan: Increase ambulation. Advance diet as tolerated. Tylenol and Motrin q6 hrs. Oxycodone prn.   Discharge patient home today, instructed to follow up with ob in 10-14 days  patient agrees with the plan  MD jose luis  DVT prophylaxis. Routine postop care.
OB Progress Note:  PPD#2    S: 26yo PPD#1 s/p . Patient feels well. Pain is well controlled. She is tolerating a regular diet and passing flatus. She is voiding spontaneously, and ambulating without difficulty. Endorses light vaginal bleeding, soaking less than 1 pad/hour. Denies CP/SOB. Denies lightheadedness/dizziness. Denies N/V. Denies HA, SOB, epigastric or RUQ pain, extremity edema, scotoma or blurry vision     O:  Vitals:  Vital Signs Last 24 Hrs  T(C): 37.6 (2023 06:05), Max: 37.6 (2023 06:05)  T(F): 99.6 (2023 06:05), Max: 99.6 (2023 06:05)  HR: 88 (2023 06:05) (78 - 100)  BP: 131/84 (2023 06:05) (111/67 - 131/84)  BP(mean): --  RR: 18 (2023 06:05) (17 - 18)  SpO2: 99% (2023 06:05) (99% - 100%)    Parameters below as of 2023 02:00  Patient On (Oxygen Delivery Method): room air        MEDICATIONS  (STANDING):  acetaminophen     Tablet .. 975 milliGRAM(s) Oral <User Schedule>  diphtheria/tetanus/pertussis (acellular) Vaccine (Adacel) 0.5 milliLiter(s) IntraMuscular once  ibuprofen  Tablet. 600 milliGRAM(s) Oral every 6 hours  influenza   Vaccine 0.5 milliLiter(s) IntraMuscular once  NIFEdipine XL 30 milliGRAM(s) Oral daily  prenatal multivitamin 1 Tablet(s) Oral daily  sodium chloride 0.9% lock flush 3 milliLiter(s) IV Push every 8 hours  sodium chloride 0.9%. 1000 milliLiter(s) (75 mL/Hr) IV Continuous <Continuous>      Labs:  Blood type: A Positive  Rubella IgG: RPR: Negative                          7.9<L>   12.16<H> >-----------< 166    ( 11-29 @ 14:21 )             23.0<L>                        11.3<L>   21.53<H> >-----------< 221    (  @ 13:30 )             32.4<L>                        13.0   15.85<H> >-----------< 226    (  @ 09:20 )             38.9                        11.6   11.75<H> >-----------< 193    (  @ 02:30 )             33.4<L>                        13.4   9.51 >-----------< 218    (  @ 14:27 )             39.3    23 @ 13:30      132<L>  |  101  |  8   ----------------------------<  121<H>  3.8   |  19<L>  |  0.97    23 @ 09:20      129<L>  |  96<L>  |  8   ----------------------------<  91  3.8   |  17<L>  |  1.04    23 @ 02:30      130<L>  |  99  |  7   ----------------------------<  95  3.6   |  20<L>  |  0.59    23 @ 14:27      133<L>  |  102  |  7   ----------------------------<  114<H>  4.9   |  18<L>  |  0.46<L>        Ca    7.4<L>      2023 13:30  Ca    8.0<L>      2023 09:20  Ca    7.5<L>      2023 02:30  Ca    8.4      2023 14:27  Mg     4.10<H>     11-29  Mg     4.40<H>     11-29  Mg     5.20<H>     11-28  Mg     6.90<H>     11-28  Mg     9.40<HH>     11-28  Mg     6.50<H>     11-28  Mg     5.60<H>     11-27  Mg     4.40<H>         TPro  5.7<L>  /  Alb  3.0<L>  /  TBili  0.5  /  DBili  x   /  AST  23  /  ALT  10  /  AlkPhos  104  23 @ 13:30  TPro  7.6  /  Alb  3.8  /  TBili  0.5  /  DBili  x   /  AST  22  /  ALT  15  /  AlkPhos  126<H>  23 @ 09:20  TPro  6.1  /  Alb  3.3  /  TBili  0.3  /  DBili  x   /  AST  15  /  ALT  11  /  AlkPhos  100  23 @ 02:30  TPro  6.8  /  Alb  3.3  /  TBili  <0.2  /  DBili  x   /  AST  28  /  ALT  16  /  AlkPhos  106  23 @ 14:27          Physical Exam:  General: NAD  Heart: all extremities well perfused  Lungs: breathing comfortably  Abdomen: soft, non-tender, non-distended, fundus firm  Vaginal: lochia wnl  Extremities: No calf tenderness or erythema

## 2023-11-30 NOTE — PROGRESS NOTE ADULT - ASSESSMENT
28y/o   PPD#2 from  in stable condition. Patient doing well.     -Continue with PO analgesia  -OOB, increase ambulation   -Continue regular diet    - QBL: 317, Hct: 39.3->32.4->23  - F/u repeat CBC this AM    #sPEC  - severe range BP  - HELLP wnl, P/C 0.4  - s/p Mag (-)  - currently asymptomatic, -131/70-84 overnight  - Continue Procardia 30XL     Joann Arizmendi,  PGY-1

## 2023-12-01 ENCOUNTER — NON-APPOINTMENT (OUTPATIENT)
Age: 28
End: 2023-12-01

## 2023-12-01 DIAGNOSIS — O14.90 UNSPECIFIED PRE-ECLAMPSIA, UNSPECIFIED TRIMESTER: ICD-10-CM

## 2023-12-01 RX ORDER — FERROUS SULFATE TAB EC 325 MG (65 MG FE EQUIVALENT) 325 (65 FE) MG
325 (65 FE) TABLET DELAYED RESPONSE ORAL 3 TIMES DAILY
Refills: 0 | Status: ACTIVE | COMMUNITY
Start: 2023-12-01

## 2023-12-01 RX ORDER — NIFEDIPINE 30 MG/1
30 TABLET, FILM COATED, EXTENDED RELEASE ORAL DAILY
Refills: 0 | Status: ACTIVE | COMMUNITY
Start: 2023-12-01

## 2023-12-03 ENCOUNTER — NON-APPOINTMENT (OUTPATIENT)
Age: 28
End: 2023-12-03

## 2023-12-05 ENCOUNTER — APPOINTMENT (OUTPATIENT)
Dept: CARE COORDINATION | Facility: HOME HEALTH | Age: 28
End: 2023-12-05
Payer: COMMERCIAL

## 2023-12-05 ENCOUNTER — TRANSCRIPTION ENCOUNTER (OUTPATIENT)
Age: 28
End: 2023-12-05

## 2023-12-05 ENCOUNTER — NON-APPOINTMENT (OUTPATIENT)
Age: 28
End: 2023-12-05

## 2023-12-05 PROCEDURE — 99350 HOME/RES VST EST HIGH MDM 60: CPT | Mod: 95

## 2023-12-05 PROCEDURE — 99417 PROLNG OP E/M EACH 15 MIN: CPT | Mod: 95

## 2023-12-05 PROCEDURE — 96127 BRIEF EMOTIONAL/BEHAV ASSMT: CPT | Mod: 95

## 2023-12-06 ENCOUNTER — NON-APPOINTMENT (OUTPATIENT)
Age: 28
End: 2023-12-06

## 2023-12-11 ENCOUNTER — NON-APPOINTMENT (OUTPATIENT)
Age: 28
End: 2023-12-11

## 2023-12-19 ENCOUNTER — NON-APPOINTMENT (OUTPATIENT)
Age: 28
End: 2023-12-19

## 2023-12-19 LAB
SURGICAL PATHOLOGY STUDY: SIGNIFICANT CHANGE UP
SURGICAL PATHOLOGY STUDY: SIGNIFICANT CHANGE UP
